# Patient Record
Sex: MALE | Race: WHITE | Employment: FULL TIME | URBAN - METROPOLITAN AREA
[De-identification: names, ages, dates, MRNs, and addresses within clinical notes are randomized per-mention and may not be internally consistent; named-entity substitution may affect disease eponyms.]

---

## 2018-06-29 ENCOUNTER — TELEPHONE (OUTPATIENT)
Dept: FAMILY MEDICINE CLINIC | Age: 58
End: 2018-06-29

## 2018-06-29 DIAGNOSIS — Z12.11 ENCOUNTER FOR SCREENING COLONOSCOPY: Primary | ICD-10-CM

## 2019-01-22 ENCOUNTER — OFFICE VISIT (OUTPATIENT)
Dept: FAMILY MEDICINE CLINIC | Age: 59
End: 2019-01-22
Payer: COMMERCIAL

## 2019-01-22 VITALS
WEIGHT: 175.8 LBS | HEART RATE: 94 BPM | DIASTOLIC BLOOD PRESSURE: 72 MMHG | SYSTOLIC BLOOD PRESSURE: 120 MMHG | HEIGHT: 72 IN | BODY MASS INDEX: 23.81 KG/M2 | OXYGEN SATURATION: 97 %

## 2019-01-22 DIAGNOSIS — I74.9 TIA DUE TO EMBOLISM (HCC): Primary | ICD-10-CM

## 2019-01-22 DIAGNOSIS — G45.9 TIA DUE TO EMBOLISM (HCC): Primary | ICD-10-CM

## 2019-01-22 PROCEDURE — 99213 OFFICE O/P EST LOW 20 MIN: CPT | Performed by: PHYSICIAN ASSISTANT

## 2019-01-22 RX ORDER — ATORVASTATIN CALCIUM 80 MG/1
80 TABLET, FILM COATED ORAL
COMMUNITY
Start: 2019-01-04 | End: 2020-06-25 | Stop reason: SDUPTHER

## 2019-01-22 RX ORDER — ASPIRIN 81 MG/1
TABLET ORAL
Refills: 3 | COMMUNITY
Start: 2019-01-04

## 2019-01-22 RX ORDER — CLOPIDOGREL BISULFATE 75 MG/1
75 TABLET ORAL
COMMUNITY
Start: 2019-01-05 | End: 2020-06-25 | Stop reason: ALTCHOICE

## 2019-01-22 ASSESSMENT — PATIENT HEALTH QUESTIONNAIRE - PHQ9
SUM OF ALL RESPONSES TO PHQ9 QUESTIONS 1 & 2: 0
2. FEELING DOWN, DEPRESSED OR HOPELESS: 0
SUM OF ALL RESPONSES TO PHQ QUESTIONS 1-9: 0
SUM OF ALL RESPONSES TO PHQ QUESTIONS 1-9: 0
1. LITTLE INTEREST OR PLEASURE IN DOING THINGS: 0

## 2019-01-22 ASSESSMENT — ENCOUNTER SYMPTOMS
GASTROINTESTINAL NEGATIVE: 1
RESPIRATORY NEGATIVE: 1

## 2019-08-06 ENCOUNTER — HOSPITAL ENCOUNTER (OUTPATIENT)
Dept: GENERAL RADIOLOGY | Age: 59
Discharge: HOME | End: 2019-08-06
Payer: COMMERCIAL

## 2019-08-06 ENCOUNTER — OFFICE VISIT (OUTPATIENT)
Dept: FAMILY MEDICINE CLINIC | Age: 59
End: 2019-08-06
Payer: COMMERCIAL

## 2019-08-06 VITALS
WEIGHT: 174 LBS | SYSTOLIC BLOOD PRESSURE: 116 MMHG | RESPIRATION RATE: 16 BRPM | DIASTOLIC BLOOD PRESSURE: 64 MMHG | BODY MASS INDEX: 23.57 KG/M2 | HEART RATE: 75 BPM | OXYGEN SATURATION: 98 % | HEIGHT: 72 IN

## 2019-08-06 DIAGNOSIS — G89.29 CHRONIC PAIN OF LEFT THUMB: Primary | ICD-10-CM

## 2019-08-06 DIAGNOSIS — M79.645 CHRONIC PAIN OF LEFT THUMB: Primary | ICD-10-CM

## 2019-08-06 DIAGNOSIS — M79.645 CHRONIC PAIN OF LEFT THUMB: ICD-10-CM

## 2019-08-06 DIAGNOSIS — G89.29 CHRONIC PAIN OF LEFT THUMB: ICD-10-CM

## 2019-08-06 PROBLEM — Q21.12 PFO (PATENT FORAMEN OVALE): Status: ACTIVE | Noted: 2019-05-23

## 2019-08-06 PROCEDURE — 99213 OFFICE O/P EST LOW 20 MIN: CPT | Performed by: INTERNAL MEDICINE

## 2019-08-06 PROCEDURE — 73120 X-RAY EXAM OF HAND: CPT

## 2019-08-06 RX ORDER — PREDNISONE 10 MG/1
TABLET ORAL
Qty: 18 TABLET | Refills: 0 | Status: SHIPPED | OUTPATIENT
Start: 2019-08-06 | End: 2020-06-25 | Stop reason: ALTCHOICE

## 2019-08-06 RX ORDER — NAPROXEN 500 MG/1
500 TABLET ORAL 2 TIMES DAILY WITH MEALS
Qty: 60 TABLET | Refills: 3 | Status: SHIPPED | OUTPATIENT
Start: 2019-08-06 | End: 2020-06-25 | Stop reason: ALTCHOICE

## 2019-08-06 ASSESSMENT — ENCOUNTER SYMPTOMS
RESPIRATORY NEGATIVE: 1
ALLERGIC/IMMUNOLOGIC NEGATIVE: 1
GASTROINTESTINAL NEGATIVE: 1

## 2019-08-06 NOTE — PROGRESS NOTES
Subjective:      Patient ID: Mandy Burt is a 59 y.o. male. HPI  Chronic pain of left thumb  2+ months of L MC-P joint L Thumb. Now getting worse. Pain when moves and recently increased swelling. No known trauma. Past Medical History:   Diagnosis Date    Hepatitis A      Current Outpatient Medications   Medication Sig Dispense Refill    naproxen (EC NAPROSYN) 500 MG EC tablet Take 1 tablet by mouth 2 times daily (with meals) 60 tablet 3    predniSONE (DELTASONE) 10 MG tablet 3 for 3 days, 2 for 3 days, 1 for 3 days. 18 tablet 0    aspirin 81 MG EC tablet TAKE 1 TABLET (81 MG TOTAL) BY MOUTH DAILY WITH BREAKFAST.  3    atorvastatin (LIPITOR) 80 MG tablet Take 80 mg by mouth      clopidogrel (PLAVIX) 75 MG tablet Take 75 mg by mouth       No current facility-administered medications for this visit. Allergies   Allergen Reactions    Hydrocodone-Acetaminophen Other (See Comments)     Fatigue and confusion.  Vicodin [Hydrocodone-Acetaminophen] Other (See Comments)     Fatigue and confusion. Social History     Tobacco Use    Smoking status: Never Smoker    Smokeless tobacco: Never Used   Substance Use Topics    Alcohol use: No     Family History   Problem Relation Age of Onset    Stroke Father        Review of Systems   Constitutional: Negative. Respiratory: Negative. Cardiovascular: Negative. Gastrointestinal: Negative. Endocrine: Negative. Musculoskeletal: Positive for joint swelling (L Thumb MC-P joint. ). Allergic/Immunologic: Negative. Neurological: Negative. Hematological: Negative. Psychiatric/Behavioral: Negative. Objective:   Physical Exam   Constitutional: He is oriented to person, place, and time. Vital signs are normal. He appears well-developed and well-nourished. No distress. HENT:   Head: Normocephalic and atraumatic.    Neck: Trachea normal, normal range of motion, full passive range of motion without pain and phonation normal. Neck Cognition and memory are normal.       Assessment:      Problem   Chronic Pain of Left Thumb. Chronic and worsening. Plan:      All above problems reviewed and the found to be unchanged except for the following :     Chronic Pain of Left Thumb  - Naprosyn 500 mg twice daily w/ food. - Prednisone 3 pills for 3 days, 2 pills for 3 days, 1 pill for 3 days. Will do X-ray. Can use thumb brace and ice.               Kyrie Oliveira MD

## 2019-08-20 ENCOUNTER — OFFICE VISIT (OUTPATIENT)
Dept: FAMILY MEDICINE CLINIC | Age: 59
End: 2019-08-20
Payer: COMMERCIAL

## 2019-08-20 VITALS
HEIGHT: 72 IN | BODY MASS INDEX: 23.57 KG/M2 | OXYGEN SATURATION: 98 % | RESPIRATION RATE: 16 BRPM | SYSTOLIC BLOOD PRESSURE: 106 MMHG | WEIGHT: 174 LBS | DIASTOLIC BLOOD PRESSURE: 58 MMHG | HEART RATE: 60 BPM

## 2019-08-20 DIAGNOSIS — M79.645 CHRONIC PAIN OF LEFT THUMB: Primary | ICD-10-CM

## 2019-08-20 DIAGNOSIS — G89.29 CHRONIC PAIN OF LEFT THUMB: Primary | ICD-10-CM

## 2019-08-20 PROCEDURE — 99213 OFFICE O/P EST LOW 20 MIN: CPT | Performed by: INTERNAL MEDICINE

## 2019-08-20 ASSESSMENT — ENCOUNTER SYMPTOMS
ALLERGIC/IMMUNOLOGIC NEGATIVE: 1
RESPIRATORY NEGATIVE: 1
GASTROINTESTINAL NEGATIVE: 1

## 2019-08-20 NOTE — PATIENT INSTRUCTIONS
All above problems reviewed and the found to be unchanged except for the following :     Chronic Pain of Left Thumb  - Continue Naprosyn, ice, and brace for 1 more week. If not resolved. Gave Referral to Hand MD. Call if new c/o.

## 2019-08-20 NOTE — ASSESSMENT & PLAN NOTE
2+ months of L MT-P joint L Thumb. Now getting worse. Pain when moves and recently increased swelling. No known trauma. Started on Medrol and NAprosyn w/ brace and pain from 10/10 to 5/10.

## 2019-08-20 NOTE — PROGRESS NOTES
Subjective:      Patient ID: Lori Kaur is a 59 y.o. male. HPI  Chronic pain of left thumb  2+ months of L MT-P joint L Thumb. Now getting worse. Pain when moves and recently increased swelling. No known trauma. Started on Medrol and NAprosyn w/ brace and pain from 10/10 to 5/10. X-ray mild OA not consistent w/ amount of swelling when presented. Past Medical History:   Diagnosis Date    Hepatitis A      Current Outpatient Medications   Medication Sig Dispense Refill    naproxen (EC NAPROSYN) 500 MG EC tablet Take 1 tablet by mouth 2 times daily (with meals) 60 tablet 3    predniSONE (DELTASONE) 10 MG tablet 3 for 3 days, 2 for 3 days, 1 for 3 days. 18 tablet 0    aspirin 81 MG EC tablet TAKE 1 TABLET (81 MG TOTAL) BY MOUTH DAILY WITH BREAKFAST.  3    atorvastatin (LIPITOR) 80 MG tablet Take 80 mg by mouth      clopidogrel (PLAVIX) 75 MG tablet Take 75 mg by mouth       No current facility-administered medications for this visit. Allergies   Allergen Reactions    Hydrocodone-Acetaminophen Other (See Comments)     Fatigue and confusion.  Vicodin [Hydrocodone-Acetaminophen] Other (See Comments)     Fatigue and confusion. Social History     Tobacco Use    Smoking status: Never Smoker    Smokeless tobacco: Never Used   Substance Use Topics    Alcohol use: No     Family History   Problem Relation Age of Onset    Stroke Father        Review of Systems   Constitutional: Negative. Respiratory: Negative. Cardiovascular: Negative. Gastrointestinal: Negative. Endocrine: Negative. Genitourinary: Negative. Musculoskeletal: Positive for arthralgias, joint swelling and myalgias. Allergic/Immunologic: Negative. Neurological: Negative. Psychiatric/Behavioral: Negative. Objective:   Physical Exam   Constitutional: He is oriented to person, place, and time. He appears well-developed and well-nourished. No distress. HENT:   Head: Normocephalic and atraumatic.    Eyes:

## 2019-09-19 ENCOUNTER — OFFICE VISIT (OUTPATIENT)
Dept: ORTHOPEDIC SURGERY | Age: 59
End: 2019-09-19
Payer: COMMERCIAL

## 2019-09-19 VITALS — HEIGHT: 72 IN | WEIGHT: 173.94 LBS | BODY MASS INDEX: 23.56 KG/M2 | RESPIRATION RATE: 15 BRPM

## 2019-09-19 DIAGNOSIS — M65.312 TRIGGER FINGER OF LEFT THUMB: ICD-10-CM

## 2019-09-19 DIAGNOSIS — M79.645 CHRONIC PAIN OF LEFT THUMB: Primary | ICD-10-CM

## 2019-09-19 DIAGNOSIS — G89.29 CHRONIC PAIN OF LEFT THUMB: Primary | ICD-10-CM

## 2019-09-19 PROCEDURE — 99243 OFF/OP CNSLTJ NEW/EST LOW 30: CPT | Performed by: ORTHOPAEDIC SURGERY

## 2019-09-19 PROCEDURE — 20550 NJX 1 TENDON SHEATH/LIGAMENT: CPT | Performed by: ORTHOPAEDIC SURGERY

## 2019-11-06 ENCOUNTER — IMMUNIZATION (OUTPATIENT)
Dept: FAMILY MEDICINE CLINIC | Age: 59
End: 2019-11-06
Payer: COMMERCIAL

## 2019-11-06 DIAGNOSIS — Z23 FLU VACCINE NEED: Primary | ICD-10-CM

## 2019-11-06 PROCEDURE — 90471 IMMUNIZATION ADMIN: CPT | Performed by: INTERNAL MEDICINE

## 2019-11-06 PROCEDURE — 90686 IIV4 VACC NO PRSV 0.5 ML IM: CPT | Performed by: INTERNAL MEDICINE

## 2019-12-05 ENCOUNTER — OFFICE VISIT (OUTPATIENT)
Dept: ORTHOPEDIC SURGERY | Age: 59
End: 2019-12-05
Payer: COMMERCIAL

## 2019-12-05 VITALS — WEIGHT: 173.94 LBS | BODY MASS INDEX: 23.56 KG/M2 | HEIGHT: 72 IN

## 2019-12-05 DIAGNOSIS — M65.311 TRIGGER FINGER OF RIGHT THUMB: ICD-10-CM

## 2019-12-05 DIAGNOSIS — M65.312 TRIGGER FINGER OF LEFT THUMB: Primary | ICD-10-CM

## 2019-12-05 PROCEDURE — 99213 OFFICE O/P EST LOW 20 MIN: CPT | Performed by: ORTHOPAEDIC SURGERY

## 2019-12-05 PROCEDURE — 20550 NJX 1 TENDON SHEATH/LIGAMENT: CPT | Performed by: ORTHOPAEDIC SURGERY

## 2019-12-05 RX ORDER — BETAMETHASONE SODIUM PHOSPHATE AND BETAMETHASONE ACETATE 3; 3 MG/ML; MG/ML
12 INJECTION, SUSPENSION INTRA-ARTICULAR; INTRALESIONAL; INTRAMUSCULAR; SOFT TISSUE ONCE
Status: COMPLETED | OUTPATIENT
Start: 2019-12-05 | End: 2019-12-05

## 2019-12-05 RX ADMIN — BETAMETHASONE SODIUM PHOSPHATE AND BETAMETHASONE ACETATE 12 MG: 3; 3 INJECTION, SUSPENSION INTRA-ARTICULAR; INTRALESIONAL; INTRAMUSCULAR; SOFT TISSUE at 07:54

## 2020-06-25 ENCOUNTER — OFFICE VISIT (OUTPATIENT)
Dept: FAMILY MEDICINE CLINIC | Age: 60
End: 2020-06-25
Payer: COMMERCIAL

## 2020-06-25 VITALS
HEART RATE: 88 BPM | HEIGHT: 72 IN | DIASTOLIC BLOOD PRESSURE: 72 MMHG | WEIGHT: 173.6 LBS | TEMPERATURE: 98.3 F | RESPIRATION RATE: 16 BRPM | BODY MASS INDEX: 23.51 KG/M2 | SYSTOLIC BLOOD PRESSURE: 112 MMHG | OXYGEN SATURATION: 98 %

## 2020-06-25 PROBLEM — Z87.74 S/P PATENT FORAMEN OVALE CLOSURE: Status: ACTIVE | Noted: 2020-06-25

## 2020-06-25 PROBLEM — I63.411 CEREBROVASCULAR ACCIDENT (CVA) DUE TO EMBOLISM OF RIGHT MIDDLE CEREBRAL ARTERY (HCC): Status: ACTIVE | Noted: 2020-06-25

## 2020-06-25 PROBLEM — Z00.00 ANNUAL PHYSICAL EXAM: Status: ACTIVE | Noted: 2020-06-25

## 2020-06-25 PROCEDURE — 99396 PREV VISIT EST AGE 40-64: CPT | Performed by: INTERNAL MEDICINE

## 2020-06-25 PROCEDURE — 36415 COLL VENOUS BLD VENIPUNCTURE: CPT | Performed by: INTERNAL MEDICINE

## 2020-06-25 RX ORDER — ATORVASTATIN CALCIUM 80 MG/1
80 TABLET, FILM COATED ORAL DAILY
Qty: 90 TABLET | Refills: 1 | Status: SHIPPED | OUTPATIENT
Start: 2020-06-25 | End: 2021-02-22

## 2020-06-25 ASSESSMENT — PATIENT HEALTH QUESTIONNAIRE - PHQ9
SUM OF ALL RESPONSES TO PHQ QUESTIONS 1-9: 0
SUM OF ALL RESPONSES TO PHQ QUESTIONS 1-9: 0
2. FEELING DOWN, DEPRESSED OR HOPELESS: 0
1. LITTLE INTEREST OR PLEASURE IN DOING THINGS: 0
SUM OF ALL RESPONSES TO PHQ9 QUESTIONS 1 & 2: 0

## 2020-06-25 ASSESSMENT — ENCOUNTER SYMPTOMS
GASTROINTESTINAL NEGATIVE: 1
RESPIRATORY NEGATIVE: 1
ALLERGIC/IMMUNOLOGIC NEGATIVE: 1

## 2020-06-25 NOTE — PROGRESS NOTES
Subjective:      Patient ID: Jodi Pimentel is a 62 y.o. male. HPI  Annual physical exam  Prostate: today  Colonoscopy: 1/2018. rechx 3-5 yrs. Brother w/ Colon Ca, pt had Adenomatous polyp. Eye: due. Hearing: poor hearing L ear>R. Immunizations: Flu shot in Oct/Nov. Rx for Shingrix. Living will: yes  Medical Power of : yes  Cardiac Risk: labs pending. S/P patent foramen ovale closure  Closed in 6/2019 after stroke in 1/2019. Cerebrovascular accident (CVA) due to embolism of right middle cerebral artery ([facility] [address]  Had patent Foramen ovale that caused stroke 1/2019. Complete resolution of symptoms. On Lipitor and Asa. No new c/o. Sees cardiology 12/19/2020. Acute pancreatitis  Had episode in 5/2015. No reoccurrence. No reason found. Did ERCP. 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 Nodular prostate without urinary obstruction  Up once/night. Seems like may be getting a little worse. Past Medical History:   Diagnosis Date    Hepatitis A      Current Outpatient Medications   Medication Sig Dispense Refill    aspirin 81 MG EC tablet TAKE 1 TABLET (81 MG TOTAL) BY MOUTH DAILY WITH BREAKFAST.  3    atorvastatin (LIPITOR) 80 MG tablet Take 80 mg by mouth       No current facility-administered medications for this visit. Allergies   Allergen Reactions    Hydrocodone-Acetaminophen Other (See Comments)     Fatigue and confusion.  Vicodin [Hydrocodone-Acetaminophen] Other (See Comments)     Fatigue and confusion. Social History     Tobacco Use    Smoking status: Never Smoker    Smokeless tobacco: Never Used   Substance Use Topics    Alcohol use: No     Family History   Problem Relation Age of Onset    Stroke Father        Review of Systems   Constitutional: Positive for fatigue. HENT: Positive for hearing loss. Respiratory: Negative. Cardiovascular: Negative. Gastrointestinal: Negative. Endocrine: Negative. Genitourinary: Negative. Musculoskeletal: Negative. Nodular Prostate Without Urinary Obstruction. Chronic mild c/o. Plan:      All above problems reviewed and the found to be unchanged except for the following :     Annual Physical Exam. Flu shot in Oct/Nov. Due for eye exam, will refer for hearing test.      S/P Patent Foramen Ovale Closure. F/u w/ Cardiology 12/2020. Call if new c/o. Cerebrovascular Accident (Cva) Due to Embolism of Right Middle Cerebral Artery (Hcc). No residual. PFO is fixed. On ASA. Acute pancreatitis. Has abnormal gland. No new c/o. Call if new c/o. Nodular Prostate Without Urinary Obstruction. Will do labs. Will call if needs further w/u. To call if new symptoms. Can use Prostate care product if wishes.               Dee Benz MD

## 2020-06-25 NOTE — PATIENT INSTRUCTIONS
All above problems reviewed and the found to be unchanged except for the following :     Annual Physical Exam. Flu shot in Oct/Nov. Due for eye exam, will refer for hearing test.      S/P Patent Foramen Ovale Closure. F/u w/ Cardiology 12/2020. Call if new c/o. Cerebrovascular Accident (Cva) Due to Embolism of Right Middle Cerebral Artery (Hcc). No residual. PFO is fixed. On ASA. Acute pancreatitis. Has abnormal gland. No new c/o. Call if new c/o. Nodular Prostate Without Urinary Obstruction. Will do labs. Will call if needs further w/u. To call if new symptoms. Can use Prostate care product if wishes. Prostate: today  Colonoscopy: 1/2018. rechx 3-5 yrs. Brother w/ Colon Ca, pt had Adenomatous polyp. Eye: due. Hearing: poor hearing L ear>R. Immunizations: Flu shot in Oct/Nov. Rx for Shingrix. Living will: yes  Medical Power of : yes  Cardiac Risk: labs pending.

## 2020-06-26 LAB
ALBUMIN SERPL-MCNC: 4.6 G/DL (ref 3.4–5)
ALP BLD-CCNC: 80 U/L (ref 40–129)
ALT SERPL-CCNC: 24 U/L (ref 10–40)
ANION GAP SERPL CALCULATED.3IONS-SCNC: 11 MMOL/L (ref 3–16)
AST SERPL-CCNC: 23 U/L (ref 15–37)
BILIRUB SERPL-MCNC: 0.9 MG/DL (ref 0–1)
BILIRUBIN DIRECT: <0.2 MG/DL (ref 0–0.3)
BILIRUBIN, INDIRECT: NORMAL MG/DL (ref 0–1)
BUN BLDV-MCNC: 16 MG/DL (ref 7–20)
CALCIUM SERPL-MCNC: 9.6 MG/DL (ref 8.3–10.6)
CHLORIDE BLD-SCNC: 103 MMOL/L (ref 99–110)
CHOLESTEROL, TOTAL: 119 MG/DL (ref 0–199)
CO2: 26 MMOL/L (ref 21–32)
CREAT SERPL-MCNC: 0.7 MG/DL (ref 0.8–1.3)
GFR AFRICAN AMERICAN: >60
GFR NON-AFRICAN AMERICAN: >60
GLUCOSE BLD-MCNC: 92 MG/DL (ref 70–99)
HCT VFR BLD CALC: 46.5 % (ref 40.5–52.5)
HDLC SERPL-MCNC: 42 MG/DL (ref 40–60)
HEMOGLOBIN: 15.8 G/DL (ref 13.5–17.5)
LDL CHOLESTEROL CALCULATED: 62 MG/DL
MCH RBC QN AUTO: 31.2 PG (ref 26–34)
MCHC RBC AUTO-ENTMCNC: 33.9 G/DL (ref 31–36)
MCV RBC AUTO: 92.1 FL (ref 80–100)
PDW BLD-RTO: 13.1 % (ref 12.4–15.4)
PHOSPHORUS: 4.1 MG/DL (ref 2.5–4.9)
PLATELET # BLD: 234 K/UL (ref 135–450)
PMV BLD AUTO: 8.4 FL (ref 5–10.5)
POTASSIUM SERPL-SCNC: 4.5 MMOL/L (ref 3.5–5.1)
PROSTATE SPECIFIC ANTIGEN: 1.28 NG/ML (ref 0–4)
RBC # BLD: 5.05 M/UL (ref 4.2–5.9)
SODIUM BLD-SCNC: 140 MMOL/L (ref 136–145)
TOTAL PROTEIN: 6.7 G/DL (ref 6.4–8.2)
TRIGL SERPL-MCNC: 74 MG/DL (ref 0–150)
TSH SERPL DL<=0.05 MIU/L-ACNC: 2.14 UIU/ML (ref 0.27–4.2)
VLDLC SERPL CALC-MCNC: 15 MG/DL
WBC # BLD: 8.3 K/UL (ref 4–11)

## 2020-07-01 ENCOUNTER — TELEPHONE (OUTPATIENT)
Dept: ORTHOPEDIC SURGERY | Age: 60
End: 2020-07-01

## 2020-07-08 RX ORDER — LIDOCAINE HYDROCHLORIDE 10 MG/ML
1 INJECTION, SOLUTION INFILTRATION; PERINEURAL ONCE
Status: CANCELLED | OUTPATIENT
Start: 2020-07-08 | End: 2020-07-08

## 2020-07-08 RX ORDER — BETAMETHASONE SODIUM PHOSPHATE AND BETAMETHASONE ACETATE 3; 3 MG/ML; MG/ML
6 INJECTION, SUSPENSION INTRA-ARTICULAR; INTRALESIONAL; INTRAMUSCULAR; SOFT TISSUE ONCE
Status: CANCELLED | OUTPATIENT
Start: 2020-07-08 | End: 2020-07-08

## 2020-07-09 ENCOUNTER — OFFICE VISIT (OUTPATIENT)
Dept: ORTHOPEDIC SURGERY | Age: 60
End: 2020-07-09
Payer: COMMERCIAL

## 2020-07-09 VITALS — HEIGHT: 72 IN | WEIGHT: 173 LBS | BODY MASS INDEX: 23.43 KG/M2

## 2020-07-09 PROCEDURE — 99214 OFFICE O/P EST MOD 30 MIN: CPT | Performed by: ORTHOPAEDIC SURGERY

## 2020-07-09 NOTE — PROGRESS NOTES
Chief Complaint:  Finger Pain (CK RIGHT TF THUMB- REQ INJECTION)       History of Present of Illness: The patient returns and he has unfortunately developed return of triggering to the right thumb. He feels that his left side is currently doing well although has some occasional stiffness without locking. Review of Systems  Pertinent items are noted in HPI  Denies fever, chills, confusion, bowel/bladder active change. Review of systems reviewed from Patient History Form dated on 9/19/2019 and available in the patient's chart under the Media tab. Examination:  On exam today he has tenderness over the A1 pulley of the right thumb with grade 3 locking. I do not appreciate any discrete locking on the opposite left thumb although he does relate some soreness near the base of the thumb and radiating to the MP region of the left index. All fingers are perfused and sensate and provocative testing for carpal tunnel syndrome is negative     Radiology:     X-rays obtained and reviewed in office:  Views    Location    Impression      No orders of the defined types were placed in this encounter. Impression:  Encounter Diagnosis   Name Primary?  Trigger finger of right thumb Yes         Treatment Plan:  I discussed with the patient the options moving forward. It appears that he would do much better with definitive outpatient trigger thumb release surgery. We talked over the options of that versus another injection. After thoughtful long conversation he decided to simply move forward with definitive outpatient right thumb trigger release. He feels comfortable with a straight local anesthetic approach. We discussed the risks, benefits, limitations, alternatives, and postop recovery following the proposed procedure. We will begin the process of scheduling surgery if there are no other preoperative medical contraindications.     Please note that this transcription was created using voice recognition software. Any errors are unintentional and may be due to voice recognition transcription.

## 2020-07-15 ENCOUNTER — TELEPHONE (OUTPATIENT)
Dept: ORTHOPEDIC SURGERY | Age: 60
End: 2020-07-15

## 2020-07-16 ENCOUNTER — OFFICE VISIT (OUTPATIENT)
Dept: PRIMARY CARE CLINIC | Age: 60
End: 2020-07-16
Payer: COMMERCIAL

## 2020-07-16 PROCEDURE — 99211 OFF/OP EST MAY X REQ PHY/QHP: CPT | Performed by: NURSE PRACTITIONER

## 2020-07-16 NOTE — PROGRESS NOTES
Elif Cosme received a viral test for COVID-19. They were educated on isolation and quarantine as appropriate. For any symptoms, they were directed to seek care from their PCP, given contact information to establish with a doctor, directed to an urgent care or the emergency room.

## 2020-07-19 LAB
SARS-COV-2: NOT DETECTED
SOURCE: NORMAL

## 2020-07-21 NOTE — H&P
HISTORY & PHYSICAL FOR LOCAL CASES    There were no vitals filed for this visit. History of present illness: Right thumb trigger digit    All allergies & meds reviewed  RELEVANT EXAMS:  Airway:  normal    Pulmonary: normal    Cardiovascular: normal    Abdominal: normal    Specific to procedure: Right trigger thumb    I have reviewed with the patient and/or family the risks, benefits and alternatives to the procedure.   ASA Class:  2    The patient condition is acceptable for planned local anesthesia:

## 2020-07-21 NOTE — OP NOTE
[address] [facility]  Procedure Note  [address]  7/22/2020    PREOPERATIVE DIAGNOSIS(ES)   Right Thumb trigger digit     POSTOPERATIVE DIAGNOSIS(ES)   Same    PROCEDURE(S)     Right Thumb trigger release    SURGEON DEVONTE CORTEZ    ANESTHESIA Local    EBL: less than 44YF    COMPLICATIONS None    INDICATIONS FOR PROCEDURE The patient has clinical evidence of triggering of the affected digit(s) and has failed appropriate conservative management. The patient understands the relevant risks, benefits, limitations, and healing process of the procedure. PROCEDURE The patient was brought to the operating room and anesthetized with local anesthetic. The identified and marked extremity was then prepped and draped in a standard fashion. A well-padded tourniquet was applied to the upper arm. The arm was exsanguinated and the tourniquet elevated to 250 mmHg. A standard transverse incision was made at the A-1 pulley level of the affected digit(s). Dissection was carried down carefully through the skin and subcutaneous tissue. Care was taken to protect the digital neurovascular bundles on both sides of the identified A-1 pulley(s). In each affected digit the pulley was incised longitudinally under direct visualization. Complete proximal and distal release was completed. The flexor tendon structures were withdrawn with a retractor and demonstrated full passive excursion. The tourniquet was released and the wound(s) irrigated with sterile saline. Skin was closed with Nylon suture. A soft, bulky dressing was applied and the patient transported to the recovery area in stable condition with good perfusion to all fingertips and no active triggering.         [address]

## 2020-07-22 ENCOUNTER — HOSPITAL ENCOUNTER (OUTPATIENT)
Age: 60
Setting detail: OUTPATIENT SURGERY
Discharge: HOME | End: 2020-07-22
Attending: ORTHOPAEDIC SURGERY | Admitting: ORTHOPAEDIC SURGERY
Payer: COMMERCIAL

## 2020-07-22 VITALS
OXYGEN SATURATION: 98 % | WEIGHT: 173 LBS | RESPIRATION RATE: 18 BRPM | DIASTOLIC BLOOD PRESSURE: 73 MMHG | SYSTOLIC BLOOD PRESSURE: 119 MMHG | TEMPERATURE: 96.8 F | HEART RATE: 65 BPM | BODY MASS INDEX: 23.43 KG/M2 | HEIGHT: 72 IN

## 2020-07-22 PROCEDURE — 7100000010 HC PHASE II RECOVERY - FIRST 15 MIN: Performed by: ORTHOPAEDIC SURGERY

## 2020-07-22 PROCEDURE — 7100000011 HC PHASE II RECOVERY - ADDTL 15 MIN: Performed by: ORTHOPAEDIC SURGERY

## 2020-07-22 PROCEDURE — 3600000002 HC SURGERY LEVEL 2 BASE: Performed by: ORTHOPAEDIC SURGERY

## 2020-07-22 PROCEDURE — 2500000003 HC RX 250 WO HCPCS: Performed by: ORTHOPAEDIC SURGERY

## 2020-07-22 PROCEDURE — 2709999900 HC NON-CHARGEABLE SUPPLY: Performed by: ORTHOPAEDIC SURGERY

## 2020-07-22 PROCEDURE — 2580000003 HC RX 258: Performed by: ORTHOPAEDIC SURGERY

## 2020-07-22 PROCEDURE — 3600000012 HC SURGERY LEVEL 2 ADDTL 15MIN: Performed by: ORTHOPAEDIC SURGERY

## 2020-07-22 RX ORDER — MAGNESIUM HYDROXIDE 1200 MG/15ML
LIQUID ORAL CONTINUOUS PRN
Status: COMPLETED | OUTPATIENT
Start: 2020-07-22 | End: 2020-07-22

## 2020-07-22 RX ORDER — OXYCODONE HYDROCHLORIDE 5 MG/1
5 TABLET ORAL EVERY 8 HOURS PRN
Qty: 10 TABLET | Refills: 0 | Status: SHIPPED | OUTPATIENT
Start: 2020-07-22 | End: 2020-07-29

## 2020-07-22 RX ORDER — BUPIVACAINE HYDROCHLORIDE 5 MG/ML
INJECTION, SOLUTION EPIDURAL; INTRACAUDAL PRN
Status: DISCONTINUED | OUTPATIENT
Start: 2020-07-22 | End: 2020-07-22 | Stop reason: ALTCHOICE

## 2020-07-22 RX ORDER — IBUPROFEN 600 MG/1
600 TABLET ORAL EVERY 6 HOURS PRN
Qty: 60 TABLET | Refills: 1 | Status: SHIPPED | OUTPATIENT
Start: 2020-07-22 | End: 2020-10-15

## 2020-07-22 ASSESSMENT — PAIN SCALES - GENERAL
PAINLEVEL_OUTOF10: 0

## 2020-07-22 ASSESSMENT — PAIN - FUNCTIONAL ASSESSMENT: PAIN_FUNCTIONAL_ASSESSMENT: 0-10

## 2020-07-22 NOTE — PROGRESS NOTES
Discharge instructions reviewed with patient/responsible adult and understanding verbalized. Discharge instructions signed and copies given. Patient discharged home with belongings.   Prescription X2 sent with pt and/or family

## 2020-07-25 PROBLEM — Z00.00 ANNUAL PHYSICAL EXAM: Status: RESOLVED | Noted: 2020-06-25 | Resolved: 2020-07-25

## 2020-07-31 ENCOUNTER — OFFICE VISIT (OUTPATIENT)
Dept: ORTHOPEDIC SURGERY | Age: 60
End: 2020-07-31

## 2020-07-31 PROCEDURE — 99024 POSTOP FOLLOW-UP VISIT: CPT | Performed by: ORTHOPAEDIC SURGERY

## 2020-07-31 NOTE — PROGRESS NOTES
The patient is doing overall quite well after surgery. The wound is healing without signs of infection or dehiscence. There is satisfactory range of motion of the fingers without triggering. Doing well after right thumb trigger finger release. We will plan for suture removal, apply a simple bandage, and discuss appropriate wound care. Activities may be advanced depending upon comfort. Follow-up and therapy will be depending upon range of symptoms over the next several weeks. We will keep an eye on the opposite left thumb that previously had a trigger digit as well but is currently asymptomatic.

## 2020-10-05 NOTE — PROGRESS NOTES
Evy Mcnair   1960, 62 y.o. male   [id number]       Referring Provider: Daisha Andre MD   Referral Type: In an order in [address] [place] [address]    Reason for Visit: Evaluation of suspected change in hearing. ADULT AUDIOLOGIC EVALUATION      Evy Mcnair is a 62 y.o. male seen today, 10/9/2020 , for an initial audiologic evaluation. AUDIOLOGIC AND OTHER PERTINENT MEDICAL HISTORY:      Evy Mcnair noted a perceived gradual decline in hearing, bilaterally. He states hearing in left ear is worse compared right. Patient reports family history of age-related hearing loss. No additional significant otologic or medical history was reported. Evy Mcnair denied otalgia, aural fullness, otorrhea, tinnitus, dizziness, imbalance, history of falls, history of occupational/recreational noise exposure, history of head trauma and history of ear surgery. Date: 10/9/2020     IMPRESSIONS:      Today's results revealed an asymmetric sensorineural hearing loss with excellent word recognition, bilaterally. Asymmetries of >10 dBHL noted from 1.5-8kHz with left ear worse than right. Hearing loss significant enough to create hearing difficulty in most listening situations. Recommended consult with ENT physician due to noted asymmetries. Discussed benefits of amplification pending medical clearance due to noted asymmetries. ASSESSMENT AND FINDINGS:     Otoscopy revealed: Clear ear canals bilaterally    RIGHT EAR:  Hearing Sensitivity: Within normal limits through 2kHz sloping to a moderate to moderately-severe sensorineural hearing loss. Speech Recognition Threshold: 15 dB HL  Word Recognition: Excellent (92%), based on NU-6 25-word list at 55 dBHL using recorded speech stimuli. Tympanometry: Normal peak pressure and compliance, Type A tympanogram, consistent with normal middle ear function.   Acoustic Reflexes: Ipsilateral: Could not maintain seal. Contralateral: Could not maintain seal.    LEFT EAR:  Hearing Sensitivity: Within normal limits through 1kHz precipitously sloping to a moderate to severe sensorineural hearing loss. Speech Recognition Threshold: 25 dB HL  Word Recognition: Excellent (100%), based on NU-6 25-word list at 75 dBHL masked using recorded speech stimuli. Tympanometry: Normal peak pressure and compliance, Type A tympanogram, consistent with normal middle ear function. Acoustic Reflexes: Ipsilateral: Did not test. Contralateral: Did not test.    Reliability: Good  Transducer: Inserts (checked with headphones)     **NOTE:  Asymmetries of >10 dBHL noted from 1.5-8kHz with left ear worse than right. Hearing loss significant enough to create hearing difficulty in most listening situations. See scanned audiogram dated 10/9/2020  for results. PATIENT EDUCATION:       The following items were discussed with the patient:    - Good Communication Strategies   - Hearing Loss and Hearing Aids    Educational information was shared in the After Visit Summary. RECOMMENDATIONS:                                                                                                                                                                                                                                                          The following items are recommended based on patient report and results from today's appointment:   - Continue medical follow-up with Fransisca Hubbard MD .   - Consult with ENT physician due to noted asymmetries. - Retest hearing as medically indicated and/or sooner if a change in hearing is noted. - If desired, schedule a Hearing Aid Evaluation (HAE) appointment to discuss hearing aid options pending medical clearance due to noted asymmetries. Recommended patient contact insurance to determine possible hearing aid benefit.    - Utilize \"Good Communication Strategies\" as discussed to assist in speech understanding with communication partners.        Cathy Molina  Audiologist    Chart CC'd to: Kat Warren MD       Degree of   Hearing Sensitivity dB Range   Within Normal Limits (WNL) 0 - 20   Mild 20 - 40   Moderate 40 - 55   Moderately-Severe 55 - 70   Severe 70 - 90   Profound 90 +

## 2020-10-09 ENCOUNTER — PROCEDURE VISIT (OUTPATIENT)
Dept: AUDIOLOGY | Age: 60
End: 2020-10-09
Payer: COMMERCIAL

## 2020-10-09 VITALS — TEMPERATURE: 97.1 F

## 2020-10-09 PROBLEM — H90.3 SENSORINEURAL HEARING LOSS, BILATERAL: Status: ACTIVE | Noted: 2020-10-09

## 2020-10-09 PROCEDURE — 92557 COMPREHENSIVE HEARING TEST: CPT | Performed by: AUDIOLOGIST

## 2020-10-09 PROCEDURE — 92567 TYMPANOMETRY: CPT | Performed by: AUDIOLOGIST

## 2020-10-09 NOTE — PATIENT INSTRUCTIONS
Good Communication Strategies    Communication can be challenging for anyone, but can be especially difficult for those with some degree of hearing loss. While we may not be able to control every factor that may lead to difficulty with communication, there are Good Communication Strategies that we can all use in our day-to-day lives. Communication takes both parties working together for it to be successful. Tips as a Listener:   1. Control your environment. It is important to limit the amount of background noise in the room when possible. You should also consider having a good light source in the room to best see the other person. 2. Ask for clarification. Instead of saying \"What?\", you can use parts of what you heard to make a new question. For example, if you heard the word \"Thursday\" but not the rest of the week, you may ask \"What was that about Thursday? \" or \"What did you want to do Thursday? \". This shows the person talking that you are listening and will help them better explain what they are saying. 3. Be an advocate for yourself. If you are hearing but not understanding, tell the other person \"I can hear you, but I need you to slow down when you speak. \"  Or if someone is facing the other direction, say \"I cannot hear you when you are not looking at me when we talk. \"       Tips as a Talker:   - Sit or stand 3 to 6 feet away to maximize audibility         -- It is unrealistic to believe someone else will fully hear your message if you are speaking from across the room or in a different room in the house   - Stay at eye level to help with visual cues   - Make sure you have the persons attention before speaking   - Use facial expressions and gestures to accentuate your message   - Raise your voice slightly (do not scream)   - Speak slowly and distinctly   - Use short, simple sentences   - Rephrase your words if the person is having a hard time understanding you    - To avoid distortion, dont speak directly into a persons ear      Some additional items that may be helpful:   - Remain patient - this is important for both parties   - Write down items that still cannot be heard/understood. You may write with pen/paper or consider typing/texting on a cell phone or smart device. - If background noise is unavoidable, try to keep yourself in a good position in the room. By sitting at a blanco on the side of the restaurant (preferably a corner), it will be easier to communicate than if you were sitting at a table in the middle with background noise surrounding you. Keep yourself positioned away from music speakers or heavy foot traffic. Hearing Loss: Care Instructions  Your Care Instructions      Hearing loss is a sudden or slow decrease in how well you hear. It can range from mild to profound. Permanent hearing loss can occur with aging, and it can happen when you are exposed long-term to loud noise. Examples include listening to loud music, riding motorcycles, or being around other loud machines. Hearing loss can affect your work and home life. It can make you feel lonely or depressed. You may feel that you have lost your independence. But hearing aids and other devices can help you hear better and feel connected to others. Follow-up care is a key part of your treatment and safety. Be sure to make and go to all appointments, and call your doctor if you are having problems. It's also a good idea to know your test results and keep a list of the medicines you take. How can you care for yourself at home? · Avoid loud noises whenever possible. This helps keep your hearing from getting worse. Always wear hearing protection around loud noises. · If appropriate, wear hearing aid(s) as directed. It is recommended that hearing aids are worn during all waking hours to keep your brain active and give it access to the sounds it is missing.       · If you are beginning your process with hearing aid(s), schedule a \"Hearing Aid Evaluation\" with an audiologist to discuss your lifestyle, features of hearing aid technology, and styles of hearing aids available. It is recommended that you contact your insurance company to determine if you have a hearing aid benefit, as this may dictate who you can see for these services. · Have hearing tests as your doctor suggests. They can show whether your hearing has changed. Your hearing aid may need to be adjusted. · Use other assistive devices as needed. These may include:  ? Telephone amplifiers and hearing aids that can connect to a television, stereo, radio, or microphone. ? Devices that use lights or vibrations. These alert you to the doorbell, a ringing telephone, or a baby monitor. ? Television closed-captioning. This shows the words at the bottom of the screen. Most new TVs can do this. ? TTY (text telephone). This lets you type messages back and forth on the telephone instead of talking or listening. These devices are also called TDD. When messages are typed on the keyboard, they are sent over the phone line to a receiving TTY. The message is shown on a monitor. · Use pagers, fax machines, text, and email if it is hard for you to communicate by telephone. · Try to learn a listening technique called speech-reading. It is not lip-reading. You pay attention to people's gestures, expressions, posture, and tone of voice. These clues can help you understand what a person is saying. Face the person you are talking to, and have him or her face you. Make sure the lighting is good. You need to see the other person's face clearly. · Think about counseling if you need help to adjust to your hearing loss. When should you call for help? Watch closely for changes in your health, and be sure to contact your doctor if:    · You think your hearing is getting worse. · You have new symptoms, such as dizziness or nausea.

## 2020-10-12 ENCOUNTER — TELEPHONE (OUTPATIENT)
Dept: FAMILY MEDICINE CLINIC | Age: 60
End: 2020-10-12

## 2020-10-12 NOTE — TELEPHONE ENCOUNTER
----- Message from Lyric Krueger MD sent at 10/9/2020  1:09 PM EDT -----  Lyric Krueger MD at 10/9/2020  1:09 PM     Status: Signed       appt to see ENT due to abnormal Audiogram            ----- Message -----  From: Cathy Richardson  Sent: 10/9/2020   9:57 AM EDT  To: MD Dr. Payton Johnson you for your referral for audiometric testing on this patient. Today's results revealed an asymmetric sensorineural hearing loss with excellent word recognition, bilaterally. Asymmetries of >10 dBHL noted from 1.5-8kHz with left ear worse than right. Hearing loss significant enough to create hearing difficulty in most listening situations. Recommended consult with ENT physician due to noted asymmetries. Discussed benefits of amplification pending medical clearance due to noted asymmetries. Please see the scanned audiogram (under \"Media\" tab) and encounter note for details. If you have any questions, or if there is anything else you need, please let me know. Cathy ElizaldeAudiologist---[facility] - Audiology

## 2020-10-15 ENCOUNTER — OFFICE VISIT (OUTPATIENT)
Dept: ENT CLINIC | Age: 60
End: 2020-10-15
Payer: COMMERCIAL

## 2020-10-15 VITALS
HEART RATE: 81 BPM | BODY MASS INDEX: 24.35 KG/M2 | HEIGHT: 72 IN | WEIGHT: 179.8 LBS | TEMPERATURE: 97.2 F | SYSTOLIC BLOOD PRESSURE: 109 MMHG | DIASTOLIC BLOOD PRESSURE: 66 MMHG

## 2020-10-15 PROCEDURE — 99203 OFFICE O/P NEW LOW 30 MIN: CPT | Performed by: OTOLARYNGOLOGY

## 2020-10-15 NOTE — TELEPHONE ENCOUNTER
Patient calling as an FYI to let you know that he saw Dr Sascha Higgins today and he recommended an MRI to rule out other issues.

## 2020-10-15 NOTE — PROGRESS NOTES
[address] [facility]  NEW PATIENT HISTORY AND PHYSICAL NOTE      Patient Name: Evy Mcnair  Medical Record Number:  [id number]  Primary Care Physician:  Daisha Andre MD    ChiefComplaint     Chief Complaint   Patient presents with    Hearing Problem     Had a hearing test on 10/09/2020 with Dr. Marquis Vazquez, here to get his ears checked before moving forward with hearing aids       History of Present Illness     Evy Mcnair is an 62 y.o. male with history of progressive hearing loss - has been ongoing for the past 3 years. Believes that the left ear is worse than the right, denies tinnitus, otorrhea, otalgia, aural fullness or vertigo. No history of chronic middle ear disease, did not have any recurrent acute otitis media as a child/no placement of tympanostomy tubes. Unremarkable birth history, no familial history of early deafness. Prior CVA 01/2019 - MRI showed right MCA infarcts. He does not smoke, denies alcohol use. Prior patent foramen ovale which has since been repaired.     Past Medical History     Past Medical History:   Diagnosis Date    Cerebral artery occlusion with cerebral infarction ([address]     TIA    Hearing loss     Hepatitis A     Hyperlipidemia        Past Surgical History     Past Surgical History:   Procedure Laterality Date    BACK SURGERY      CARDIAC SURGERY      cath - repair PFO    CHOLECYSTECTOMY      COLONOSCOPY  2007    FINGER TRIGGER RELEASE Right 7/22/2020    RIGHT TRIGGER THUMB RELEASE performed by Scarlett Cheng MD at [address] [place] [address]         Family History     Family History   Problem Relation Age of Onset    Stroke Father     Breast Cancer Sister     Colon Cancer Brother        Social History     Social History     Tobacco Use    Smoking status: Never Smoker    Smokeless tobacco: Never Used   Substance Use Topics    Alcohol use: No    Drug use: No        Allergies     Allergies   Allergen Reactions    Hydrocodone-Acetaminophen Other (See Comments)     Fatigue and confusion.  Vicodin [Hydrocodone-Acetaminophen] Other (See Comments)     Fatigue and confusion. Medications     Current Outpatient Medications   Medication Sig Dispense Refill    atorvastatin (LIPITOR) 80 MG tablet Take 1 tablet by mouth daily 90 tablet 1    aspirin 81 MG EC tablet TAKE 1 TABLET (81 MG TOTAL) BY MOUTH DAILY WITH BREAKFAST.  3     No current facility-administered medications for this visit.         Review of Systems     REVIEW OF SYSTEMS  The following systems were reviewed and revealed the following in addition to any already discussed in the HPI:    CONSTITUTIONAL: No weight loss, no fever, no night sweats, no chills  EYES: no vision changes, no blurry vision  EARS: + Changes in hearing, no otalgia  NOSE: no epistaxis, no rhinorrhea  RESPIRATORY: No difficulty breathing, no shortness of breath  CV: no chest pain, no peripheral vascular disease  HEME: No coagulation disorder, no bleeding disorder  NEURO: No TIA or stroke-like symptoms  SKIN: No new rashes in the head and neck, no recent skin cancers  MOUTH: No new ulcers, no recent teeth infections  GASTROINTESTINAL: No diarrhea, stomach pain  PSYCH: No anxiety, no depression    PhysicalExam     Vitals:    10/15/20 0851   BP: 109/66   Site: Left Upper Arm   Position: Sitting   Cuff Size: Medium Adult   Pulse: 81   Temp: 97.2 °F (36.2 °C)   TempSrc: Infrared   Weight: 179 lb 12.8 oz (81.6 kg)   Height: 6' (1.829 m)       PHYSICAL EXAM  /66 (Site: Left Upper Arm, Position: Sitting, Cuff Size: Medium Adult)   Pulse 81   Temp 97.2 °F (36.2 °C) (Infrared)   Ht 6' (1.829 m)   Wt 179 lb 12.8 oz (81.6 kg)   BMI 24.39 kg/m²     GENERAL: No Acute Distress, Alert and Oriented, no hoarseness  EYES: EOMI, Anti-icteric  NOSE: On anterior rhinoscopy there is no epistaxis, nasal mucosa within normal limits, no purulent drainage  EARS: Normal external appearance; on portable otomicroscopy:  -Right ear: External auditory canal without stenosis, tympanic membrane clear, no middle ear effusions or retractions  -Left ear: External auditory canal without stenosis, tympanic membrane clear, no middle ear effusions or retractions  -Tuning fork testing: With a 512-Hz tuning fork the Jarvis is midline, The Rinne on the right ear the is air>bone conduction, on the left ear air>bone conduction  FACE: 1/6 House-Brackmann Scale, symmetric, sensation equal bilaterally  ORAL CAVITY: No masses or lesions palpated, uvula is absent, moist mucous membranes, 0+ tonsils, dentition with multiple fillings,  NECK: Normal range of motion, no thyromegaly, trachea is midline, no lymphadenopathy, no neck masses, no crepitus  CHEST: Normal respiratory effort, no retractions, breathing comfortably  SKIN: No rashes, normal appearing skin, no evidence of skin lesions/tumors  NEURO: CN 2, 3, 4, 5, 6, 7, 11, 12 intact bilaterally     Data/Imaging Review     RIGHT EAR:  Hearing Sensitivity: Within normal limits through 2kHz sloping to a moderate to moderately-severe sensorineural hearing loss. Speech Recognition Threshold: 15 dB HL  Word Recognition: Excellent (92%), based on NU-6 25-word list at 55 dBHL using recorded speech stimuli. Tympanometry: Normal peak pressure and compliance, Type A tympanogram, consistent with normal middle ear function. Acoustic Reflexes: Ipsilateral: Could not maintain seal. Contralateral: Could not maintain seal.     LEFT EAR:  Hearing Sensitivity: Within normal limits through 1kHz precipitously sloping to a moderate to severe sensorineural hearing loss. Speech Recognition Threshold: 25 dB HL  Word Recognition: Excellent (100%), based on NU-6 25-word list at 75 dBHL masked using recorded speech stimuli. Tympanometry: Normal peak pressure and compliance, Type A tympanogram, consistent with normal middle ear function.   Acoustic Reflexes: Ipsilateral: Did not test. Contralateral: Did not test.     Reliability: Good  Transducer: Inserts (checked with headphones)      **NOTE:  Asymmetries of >10 dBHL noted from 1.5-8kHz with left ear worse than right. Hearing loss significant enough to create hearing difficulty in most listening situations.     See scanned audiogram dated 10/9/2020  for results.            Procedure     None    Assessment and Plan     1. Asymmetrical hearing loss of both ears  67-year-old male with history of progressive hearing loss, left ear worse than the right. Prior audiometric testing shows asymmetric hearing loss in the left ear, with greater than 15 dB deficits from 2 kHz - 8 kHz. He denies any tinnitus, otorrhea, otalgia, aural fullness or vertigo. On examination the ear canals are clear, the tympanic membranes are without retractions/perforations, and the middle ears are well aerated. Due to the lack of pathology on physical examination, combined with asymmetric sensorineural hearing loss, we have recommended an MRI to evaluate for retrocochlear pathology. He is agreed to undergo this study, and we will call him back with results. If no pathology is identified, we will clear him for hearing aids. - MRI IAC POSTERIOR FOSSA W WO CONTRAST; Future  - BASIC METABOLIC PANEL; Future    Return if symptoms worsen or fail to improve. Kathy Ash MD  [facility]  Department of Otolaryngology/Head and Neck Surgery  10/15/20    I have performed a head and neck physical exam personally or was physically present during the key or critical portions of the service. Medical Decision Making:   The following items were considered in medical decision making:  Independent review of images  Review / order clinical lab tests  Review / order radiology tests  Decision to obtain old records  Review and summation of old records as accessed through [facility] (a summary of my findings in these old records: Reviewed records from cardiology at [facility])     Portions of this note were dictated using Dragon.  There may be linguistic errors secondary to the use of this program.

## 2020-10-16 ENCOUNTER — TELEPHONE (OUTPATIENT)
Dept: ENT CLINIC | Age: 60
End: 2020-10-16

## 2020-10-16 NOTE — TELEPHONE ENCOUNTER
AMPALTZERTOF Device in heart that could stop him from getting the MRI that was ordered. Patient would like to talk to Dr. Antonio Hines about this. Cell phone number [phone].

## 2020-10-16 NOTE — TELEPHONE ENCOUNTER
Called patient back - per literature at [facility] device is safe for MRI use up to 3 Ana.   We encouraged him to discuss with the radiology staff prior to his MRI, and this patient affirmed understanding

## 2020-10-30 ENCOUNTER — HOSPITAL ENCOUNTER (OUTPATIENT)
Dept: MRI IMAGING | Age: 60
Discharge: HOME | End: 2020-10-30
Payer: COMMERCIAL

## 2020-10-30 ENCOUNTER — HOSPITAL ENCOUNTER (OUTPATIENT)
Age: 60
Discharge: HOME | End: 2020-10-30
Payer: COMMERCIAL

## 2020-10-30 LAB
ANION GAP SERPL CALCULATED.3IONS-SCNC: 9 MMOL/L (ref 3–16)
BUN BLDV-MCNC: 16 MG/DL (ref 7–20)
CALCIUM SERPL-MCNC: 9.5 MG/DL (ref 8.3–10.6)
CHLORIDE BLD-SCNC: 106 MMOL/L (ref 99–110)
CO2: 28 MMOL/L (ref 21–32)
CREAT SERPL-MCNC: 0.8 MG/DL (ref 0.8–1.3)
GFR AFRICAN AMERICAN: >60
GFR NON-AFRICAN AMERICAN: >60
GLUCOSE BLD-MCNC: 111 MG/DL (ref 70–99)
POTASSIUM SERPL-SCNC: 4.2 MMOL/L (ref 3.5–5.1)
SODIUM BLD-SCNC: 143 MMOL/L (ref 136–145)

## 2020-10-30 PROCEDURE — 80048 BASIC METABOLIC PNL TOTAL CA: CPT

## 2020-10-30 PROCEDURE — 6360000004 HC RX CONTRAST MEDICATION: Performed by: OTOLARYNGOLOGY

## 2020-10-30 PROCEDURE — A9579 GAD-BASE MR CONTRAST NOS,1ML: HCPCS | Performed by: OTOLARYNGOLOGY

## 2020-10-30 PROCEDURE — 70553 MRI BRAIN STEM W/O & W/DYE: CPT

## 2020-10-30 PROCEDURE — 36415 COLL VENOUS BLD VENIPUNCTURE: CPT

## 2020-10-30 RX ADMIN — GADOTERIDOL 15 ML: 279.3 INJECTION, SOLUTION INTRAVENOUS at 08:58

## 2020-11-09 ENCOUNTER — TELEPHONE (OUTPATIENT)
Dept: ENT CLINIC | Age: 60
End: 2020-11-09

## 2020-11-09 NOTE — TELEPHONE ENCOUNTER
Call patient with results of MRI-no CPA lesions identified, he is cleared for hearing aids. He will follow-up with Dr. Brenda Collier for hearing aid evaluation and fittings. Zack Li

## 2020-11-15 NOTE — PROGRESS NOTES
Emily Garner  [birth year] y.o. male   [id number]      HEARING AID EVALUATION    Emily Garner was seen today, 11/19/2020 , for a Hearing Aid Evaluation following audiologic evaluation on 10/9/2020. Patient has no prior history of hearing aid use. Patient was found to have insurance benefit of up to 80% of cost of devices for hearing aids after deductible is met. Per insurance benefit check, patient also has $1,277.55 left to meet out-of-pocket expenses limit of $3,000.00. Patient understand he is responsible for any cost of devices insurance does not cover. Hearing aid benefit worksheet scanned into media tab. DATE: 11/19/2020     PROCEDURES:     SOUNDFIELD TESTING:     Name of test Type of amplification Level of signal Level of noise % Correct        Nu-6  None 55dBHL N/A 96%        Nu-6  None 45dBHL N/A 76%    QuickSIN None 70dBHL varied 3.0 SNR loss       LIFESTYLE EVALUATION:      How do you spend most of your day? 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 Overall feels he hears conversation in hospital, but notes difficulty with distance and background noise. Patient also reports difficulty in group settings with larger and smaller groups. Notably, patient attends indoor and outdoor concerts. He states he hears outdoor concerts well, but has difficulty hearing when there is talking in-between songs; especially indoors. Which ear do you use on the phone? Right  (notes difficulty on phone at work)        Landline or cellphone  Both     Has anyone recommended you wear HAs before? No      If yes, have you tried HAs? If no, why not? Do you feel like your hearing loss limits or hampers your personal or social life in any way? No    Does a hearing problem cause you to feel embarrassed when you meet new people? Yes    How do you compensate for things you miss or misunderstand?   Ask to repeat but sometimes if he misses it he will pretend he heard it. Does a hearing problem cause you to feel frustrated when talking to members of your family? No      Do they get frustrated with you? No    How does it make you feel when you miss things? Not good because worried people think he is not paying attention. Do you feel handicapped by your hearing problem? Yes    Does your hearing problem cause you difficulty when visiting friends, relatives, or neighbors? Yes    Does your hearing problem cause you to attend [employer] services or group meetings less often than you would like? Yes    Provide a guestimate of the % of the service or meeting that you hear and understand clearly  %    Does your hearing problem cause you difficulty when listening to a TV or radio? Yes    Do others feel that your TV/radio is too loud? Yes    Does a hearing problem cause you difficulty when in a restaurant with relatives or friends? Yes    What % of conversation do you feel that you hear in groups/restaurants? 75-85%    When is your hearing loss most problematic? Using the phone and group settings. In what situation would you most like to hear better? 1. Work (conversations and phone)  2. Social    Do you want to be able to connect directly to your phone with your hearing aids? Android- Yes    After a discussion of evaluation results, discussion of levels of technology and prices, and lifestyle assessment, Elisa Garcia has decided to pursue hearing aids. Technology to be ordered: Emiliano TOUSSAINT Picked color P5,  power 2(M), large [facility], . Signed medical purchase agreement. Patient cost due at time of fitting: $980.00 of total $4,900.00 to billed to insurance (expected insurance benefit of up to 80% of cost of devices). PATIENT EDUCATION:      - Verbally reviewed benefits and limitations of devices and available features in hearing aids.     - Verbally discussed realistic expectations of hearing aid use     Information was shared verbally with patient during appointment. RECOMMENDATIONS:      - Return as scheduled to be fit with hearing aids. - Contact audiologist with questions/concerns as needed. No charge for today's appointment.     Cathy Clifford  Audiologist

## 2020-11-17 ENCOUNTER — TELEPHONE (OUTPATIENT)
Dept: AUDIOLOGY | Age: 60
End: 2020-11-17

## 2020-11-17 NOTE — TELEPHONE ENCOUNTER
Patient called to talk to Dr. Ferdinand Dance about his Hearing Aid benefit before his appointment. Best number is [phone]. Patient is at work so might not be able to answer.

## 2020-11-19 ENCOUNTER — PROCEDURE VISIT (OUTPATIENT)
Dept: AUDIOLOGY | Age: 60
End: 2020-11-19

## 2020-11-19 VITALS — TEMPERATURE: 96.4 F

## 2020-11-19 PROCEDURE — 99999 PR OFFICE/OUTPT VISIT,PROCEDURE ONLY: CPT | Performed by: AUDIOLOGIST

## 2020-11-22 NOTE — PROGRESS NOTES
Jake Cespedes   1960, 62 y.o. male   [id number]     HEARING AID FITTING      RIGHT EAR: /[employer]  SN: [id number]  EARMOLD/TUBING/WIRE/DOME: 2(M) with large open domes    LEFT EAR: /[employer]  SN: [id number]  EARMOLD/TUBING/WIRE/DOME: 2(M) with large open domes    HAF: 2020  30 DAY RIGHT-TO-RETURN: 2020  WARRANTY: 2023    BUTTONS: Disabled (tap-control for phone calls activated)  PROGRAMS: Auto-Sense  PHONE CONNECTIVITY: Devices connected to [employer]. Jake Cespedes was seen today, 2020   to be fit with [employer] P70-R hearing aids. Patient fit with 2(M) receivers and large open domes which appear to be a good fit. Programmed to 100% target gain. Counseled patient on use and care of devices and on realistic expectations. Device orientation was completed, includin. Hearing aid insertion/removal   2. Buttons/Indicators   3. How to charge devices     4. Cleaning/maintenance of the device     5. Loss & Damage/Repair warranty information   6. 30-day right-to-return period    Jake Cespedes noted good sound quality and comfort with hearing aids. Patient demonstrated proper insertion and removal of devices in office. Paired devices to patient's Android phone and downloaded [employer] tiesha. Patient demonstrated he was able to answer and use devices for phone calls in office. Completed delivery statement and reviewed 30 day trial period and recall process for programming adjustments. PATIENT EDUCATION:       Jake Cespedes was provided with the Hearing Aid Fitting Checklist as a reference of items discussed at today's appointment. Patient may find additional product information in the provided hearing aid  device manual.    Information was shared verbally with patient during appointment.     RECOMMENDATIONS:     - Return for follow-up appointment within 30-day right-to-return period.  - Continue wearing both HAs during all waking hours. - Retest hearing as medically indicated and/or sooner if a change in hearing is noted. - Contact audiologist with questions/concerns as needed. Patient paid $980.00 of total $4,900.00 billed to insurance. (Expected insurance benefit of up to 80% of cost of devices after deductible is met).      Unbundled Billing- see associated fees and codes below:          Cathy Wang  Audiologist

## 2020-11-27 ENCOUNTER — PROCEDURE VISIT (OUTPATIENT)
Dept: AUDIOLOGY | Age: 60
End: 2020-11-27
Payer: COMMERCIAL

## 2020-11-27 PROCEDURE — V5261 HEARING AID, DIGIT, BIN, BTE: HCPCS | Performed by: AUDIOLOGIST

## 2020-11-27 PROCEDURE — V5020 CONFORMITY EVALUATION: HCPCS | Performed by: AUDIOLOGIST

## 2020-11-27 PROCEDURE — V5265 EAR MOLD/INSERT, DISP: HCPCS | Performed by: AUDIOLOGIST

## 2020-11-27 PROCEDURE — V5011 HEARING AID FITTING/CHECKING: HCPCS | Performed by: AUDIOLOGIST

## 2020-11-27 PROCEDURE — V5160 DISPENSING FEE BINAURAL: HCPCS | Performed by: AUDIOLOGIST

## 2020-12-10 ENCOUNTER — PROCEDURE VISIT (OUTPATIENT)
Dept: AUDIOLOGY | Age: 60
End: 2020-12-10

## 2020-12-10 VITALS — TEMPERATURE: 97.6 F

## 2020-12-10 PROCEDURE — 99999 PR OFFICE/OUTPT VISIT,PROCEDURE ONLY: CPT | Performed by: AUDIOLOGIST

## 2020-12-30 ENCOUNTER — PROCEDURE VISIT (OUTPATIENT)
Dept: AUDIOLOGY | Age: 60
End: 2020-12-30

## 2020-12-30 PROCEDURE — 99999 PR OFFICE/OUTPT VISIT,PROCEDURE ONLY: CPT | Performed by: AUDIOLOGIST

## 2021-02-22 RX ORDER — ATORVASTATIN CALCIUM 80 MG/1
TABLET, FILM COATED ORAL
Qty: 90 TABLET | Refills: 1 | Status: SHIPPED | OUTPATIENT
Start: 2021-02-22 | End: 2021-08-19 | Stop reason: SDUPTHER

## 2021-03-31 ENCOUNTER — TELEPHONE (OUTPATIENT)
Dept: FAMILY MEDICINE CLINIC | Age: 61
End: 2021-03-31

## 2021-03-31 NOTE — TELEPHONE ENCOUNTER
Spoke with Dat Beck. All symptoms have resolved today. No new complaints. Reassured. If new issues, call or return to clinic. Patient understands and agrees with plan. All questions were answered.

## 2021-04-11 NOTE — PROGRESS NOTES
Brooke Ashby  [birth year]83 y.o. male   [id number]    HEARING AID CHECK    Brooke Ashby was seen today, 4/15/2021, for a hearing aid check appointment    PROCEDURES:     Otoscopy revealed: Clear ear canals bilaterally    Patient reported overall satisfaction with devices as he states he is hearing well at work; however notes sound can sometimes seem loud at home. Counseled patient on use of [employer]. Hearing aids were cleaned and checked. A listening check revealed good function of the devices. Microphone and  ports were suctioned, domes and wax traps were changed, and devices completed a desiccant cycle through [employer]. Post-cleaning listening check revealed good function of the devices. Connected devices to fitting software. Datalogging revealed 13 hours of use per day. PATIENT EDUCATION:     - Verbally and visually reviewed importance of consistent use and care and maintenance of devices. Information was verbally shared with patient during appointment. RECOMMENDATIONS:     - Continue consistent hearing aid use  - Return for hearing aid checks as needed  - Retest hearing as medically indicated and/or sooner if a change in hearing is noted. - Contact audiologist with questions/concerns as needed. **No charge for today's appointment; patient under service warranty through 11/27/2021.      Cathy Lange  Audiologist

## 2021-04-15 ENCOUNTER — PROCEDURE VISIT (OUTPATIENT)
Dept: AUDIOLOGY | Age: 61
End: 2021-04-15

## 2021-04-15 DIAGNOSIS — H90.3 SENSORINEURAL HEARING LOSS, BILATERAL: Primary | ICD-10-CM

## 2021-04-15 PROCEDURE — 99999 PR OFFICE/OUTPT VISIT,PROCEDURE ONLY: CPT | Performed by: AUDIOLOGIST

## 2021-08-19 RX ORDER — ATORVASTATIN CALCIUM 80 MG/1
80 TABLET, FILM COATED ORAL DAILY
Qty: 90 TABLET | Refills: 1 | Status: SHIPPED | OUTPATIENT
Start: 2021-08-19 | End: 2022-02-15

## 2021-08-19 NOTE — TELEPHONE ENCOUNTER
----- Message from [facility] sent at 8/19/2021  9:43 AM EDT -----  Subject: Refill Request    QUESTIONS  Name of Medication? atorvastatin (LIPITOR) 80 MG tablet  Patient-reported dosage and instructions? 80 MG 1 per day  How many days do you have left? 21  Preferred Pharmacy? [id number]  Pharmacy phone number (if available)? [phone]  ---------------------------------------------------------------------------  --------------  Rea VICTOR  What is the best way for the office to contact you? OK to leave message on   voicemail, OK to respond with electronic message via [employer] portal (only   for patients who have registered [employer] account)  Preferred Call Back Phone Number?  [phone]

## 2021-08-20 NOTE — PROGRESS NOTES
Quinton Conner  [birth year]24 y.o. male   [id number]    HEARING AID CHECK    Quinton Conner was seen today, 8/25/2021, for a hearing aid check appointment     PROCEDURES:     Otoscopy revealed: Clear ear canals bilaterally    Hearing aids were cleaned and checked. A listening check revealed good function of the devices. Microphone and  ports were suctioned, domes and wax traps were changed, and devices completed a desiccant cycle through [facility]. Post-cleaning listening check revealed good function of the devices. Connected devices to fitting software. Datalogging revealed 14 hours of use per day. PATIENT EDUCATION:     - Verbally and visually reviewed importance of consistent use and care and maintenance of devices. Information was verbally shared with patient during appointment. RECOMMENDATIONS:     - Continue consistent hearing aid use  - Return for hearing aid checks as needed  - Retest hearing as medically indicated and/or sooner if a change in hearing is noted. - Contact audiologist with questions/concerns as needed    **No charge for today's appointment; patient under service warranty through 11/27/21.        Cathy Gale  Audiologist

## 2021-08-25 ENCOUNTER — PROCEDURE VISIT (OUTPATIENT)
Dept: AUDIOLOGY | Age: 61
End: 2021-08-25

## 2021-08-25 DIAGNOSIS — H90.3 SENSORINEURAL HEARING LOSS, BILATERAL: Primary | ICD-10-CM

## 2021-08-25 PROCEDURE — 99999 PR OFFICE/OUTPT VISIT,PROCEDURE ONLY: CPT | Performed by: AUDIOLOGIST

## 2021-10-21 ENCOUNTER — TELEPHONE (OUTPATIENT)
Dept: FAMILY MEDICINE CLINIC | Age: 61
End: 2021-10-21

## 2021-10-21 NOTE — TELEPHONE ENCOUNTER
With Dr. Mikala Jackson retiring, patient would like to establish with you. Due now for a yearly check up. Will you accept?

## 2021-11-05 ENCOUNTER — OFFICE VISIT (OUTPATIENT)
Dept: FAMILY MEDICINE CLINIC | Age: 61
End: 2021-11-05
Payer: COMMERCIAL

## 2021-11-05 VITALS
DIASTOLIC BLOOD PRESSURE: 70 MMHG | OXYGEN SATURATION: 97 % | WEIGHT: 180.6 LBS | HEIGHT: 72 IN | RESPIRATION RATE: 18 BRPM | BODY MASS INDEX: 24.46 KG/M2 | SYSTOLIC BLOOD PRESSURE: 122 MMHG | HEART RATE: 83 BPM

## 2021-11-05 DIAGNOSIS — Z00.00 ANNUAL PHYSICAL EXAM: Primary | ICD-10-CM

## 2021-11-05 PROBLEM — M65.312 TRIGGER FINGER OF LEFT THUMB: Status: RESOLVED | Noted: 2019-09-19 | Resolved: 2021-11-05

## 2021-11-05 PROBLEM — G89.29 CHRONIC PAIN OF LEFT THUMB: Status: RESOLVED | Noted: 2019-08-06 | Resolved: 2021-11-05

## 2021-11-05 PROBLEM — H90.3 SENSORINEURAL HEARING LOSS, BILATERAL: Status: RESOLVED | Noted: 2020-10-09 | Resolved: 2021-11-05

## 2021-11-05 PROBLEM — Z87.74 S/P PATENT FORAMEN OVALE CLOSURE: Status: RESOLVED | Noted: 2020-06-25 | Resolved: 2021-11-05

## 2021-11-05 PROBLEM — I63.411 CEREBROVASCULAR ACCIDENT (CVA) DUE TO EMBOLISM OF RIGHT MIDDLE CEREBRAL ARTERY (HCC): Status: RESOLVED | Noted: 2020-06-25 | Resolved: 2021-11-05

## 2021-11-05 PROBLEM — M65.311 TRIGGER FINGER OF RIGHT THUMB: Status: RESOLVED | Noted: 2019-12-05 | Resolved: 2021-11-05

## 2021-11-05 PROBLEM — M79.645 CHRONIC PAIN OF LEFT THUMB: Status: RESOLVED | Noted: 2019-08-06 | Resolved: 2021-11-05

## 2021-11-05 LAB
A/G RATIO: 2 (ref 1.1–2.2)
ALBUMIN SERPL-MCNC: 4.4 G/DL (ref 3.4–5)
ALP BLD-CCNC: 102 U/L (ref 40–129)
ALT SERPL-CCNC: 27 U/L (ref 10–40)
ANION GAP SERPL CALCULATED.3IONS-SCNC: 13 MMOL/L (ref 3–16)
AST SERPL-CCNC: 32 U/L (ref 15–37)
BILIRUB SERPL-MCNC: 0.5 MG/DL (ref 0–1)
BUN BLDV-MCNC: 16 MG/DL (ref 7–20)
CALCIUM SERPL-MCNC: 9.2 MG/DL (ref 8.3–10.6)
CHLORIDE BLD-SCNC: 106 MMOL/L (ref 99–110)
CHOLESTEROL, TOTAL: 114 MG/DL (ref 0–199)
CO2: 24 MMOL/L (ref 21–32)
CREAT SERPL-MCNC: 0.9 MG/DL (ref 0.8–1.3)
GFR AFRICAN AMERICAN: >60
GFR NON-AFRICAN AMERICAN: >60
GLUCOSE BLD-MCNC: 98 MG/DL (ref 70–99)
HDLC SERPL-MCNC: 45 MG/DL (ref 40–60)
HEPATITIS C ANTIBODY INTERPRETATION: NORMAL
LDL CHOLESTEROL CALCULATED: 58 MG/DL
POTASSIUM SERPL-SCNC: 4.2 MMOL/L (ref 3.5–5.1)
PROSTATE SPECIFIC ANTIGEN: 0.57 NG/ML (ref 0–4)
SODIUM BLD-SCNC: 143 MMOL/L (ref 136–145)
TOTAL PROTEIN: 6.6 G/DL (ref 6.4–8.2)
TRIGL SERPL-MCNC: 56 MG/DL (ref 0–150)
VLDLC SERPL CALC-MCNC: 11 MG/DL

## 2021-11-05 PROCEDURE — 36415 COLL VENOUS BLD VENIPUNCTURE: CPT | Performed by: PHYSICIAN ASSISTANT

## 2021-11-05 PROCEDURE — 99396 PREV VISIT EST AGE 40-64: CPT | Performed by: PHYSICIAN ASSISTANT

## 2021-11-05 ASSESSMENT — PATIENT HEALTH QUESTIONNAIRE - PHQ9
2. FEELING DOWN, DEPRESSED OR HOPELESS: 0
1. LITTLE INTEREST OR PLEASURE IN DOING THINGS: 0
SUM OF ALL RESPONSES TO PHQ QUESTIONS 1-9: 0
SUM OF ALL RESPONSES TO PHQ9 QUESTIONS 1 & 2: 0

## 2021-11-05 NOTE — PROGRESS NOTES
History and Physical      Александр Matthews  YOB: 1960    Date of Service:  11/5/2021    Chief Complaint:   Александр Matthews is a 56 y.o. male who presents for complete physical examination. HPI: Overall feeling well.      Wt Readings from Last 3 Encounters:   11/05/21 180 lb 9.6 oz (81.9 kg)   10/15/20 179 lb 12.8 oz (81.6 kg)   07/22/20 173 lb (78.5 kg)     BP Readings from Last 3 Encounters:   11/05/21 122/70   10/15/20 109/66   07/22/20 119/73       Patient Active Problem List   Diagnosis    Acute pancreatitis    Hypokalemia    Dog bite of face    Nodular prostate without urinary obstruction    Disorder of prostate    Chronic pain of left thumb    Trigger finger of left thumb    Trigger finger of right thumb    S/P patent foramen ovale closure    Cerebrovascular accident (CVA) due to embolism of right middle cerebral artery ([address]    Sensorineural hearing loss, bilateral       Preventive Care:  Health Maintenance   Topic Date Due    Hepatitis C screen  Never done    HIV screen  Never done    Lipid screen  06/25/2021    Flu vaccine (1) 09/01/2021    COVID-19 Vaccine (3 - Pfizer booster) 09/30/2021    DTaP/Tdap/Td vaccine (2 - Td or Tdap) 07/23/2025    Colon cancer screen colonoscopy  10/11/2031    Shingles Vaccine  Completed    Hepatitis A vaccine  Aged Out    Hepatitis B vaccine  Aged Out    Hib vaccine  Aged Out    Meningococcal (ACWY) vaccine  Aged Out    Pneumococcal 0-64 years Vaccine  Aged Out     Self-testicular exams: Yes  Sexual activity: single partner  Last eye exam: 2021  Exercise: yes  Seatbelt use: yes  Lipid panel:    Lab Results   Component Value Date    TRIG 74 06/25/2020    HDL 42 06/25/2020    HDL 40 04/27/2010    LDLCALC 62 06/25/2020     Living will: yes,   copy requested    Immunization History   Administered Date(s) Administered    COVID-19, Pfizer, PF, 30mcg/0.3mL 03/09/2021, 03/30/2021    Influenza, MDCK Quadv, with preserv IM (Flucelvax 2 yrs and older) 09/28/2020    Influenza, Quadv, IM, (6 mo and older Fluzone, Flulaval, Fluarix and 3 yrs and older Afluria) 01/04/2019    Influenza, Quadv, IM, PF (6 mo and older Fluzone, Flulaval, Fluarix, and 3 yrs and older Afluria) 01/04/2019, 11/06/2019    Td vaccine (adult) 10/26/2001    Tdap (Boostrix, Adacel) 07/23/2015    Zoster Recombinant (Shingrix) 06/30/2020, 09/28/2020       Allergies   Allergen Reactions    Hydrocodone-Acetaminophen Other (See Comments)     Fatigue and confusion.  Vicodin [Hydrocodone-Acetaminophen] Other (See Comments)     Fatigue and confusion. Current Outpatient Medications   Medication Sig Dispense Refill    atorvastatin (LIPITOR) 80 MG tablet Take 1 tablet by mouth daily 90 tablet 1    aspirin 81 MG EC tablet TAKE 1 TABLET (81 MG TOTAL) BY MOUTH DAILY WITH BREAKFAST.  3     No current facility-administered medications for this visit.        Past Medical History:   Diagnosis Date    Cerebral artery occlusion with cerebral infarction ([address]     TIA    Hearing loss     Hepatitis A     Hyperlipidemia      Past Surgical History:   Procedure Laterality Date    BACK SURGERY      CARDIAC SURGERY      cath - repair PFO    CHOLECYSTECTOMY      COLONOSCOPY  2007    FINGER TRIGGER RELEASE Right 7/22/2020    RIGHT TRIGGER THUMB RELEASE performed by Jayshree Brand MD at [address] [place] [address]       Family History   Problem Relation Age of Onset    Stroke Father     Breast Cancer Sister     Colon Cancer Brother      Social History     Socioeconomic History    Marital status: Single     Spouse name: Not on file    Number of children: Not on file    Years of education: Not on file    Highest education level: Not on file   Occupational History    Not on file   Tobacco Use    Smoking status: Never Smoker    Smokeless tobacco: Never Used   Vaping Use    Vaping Use: Never used   Substance and Sexual Activity    Alcohol use: No    Drug use: No    Sexual activity: Yes     Partners: Male   Other Topics Concern    Not on file   Social History Narrative    Not on file     Social Determinants of Health     Financial Resource Strain:     Difficulty of Paying Living Expenses:    Food Insecurity:     Worried About Running Out of Food in the Last Year:     [address] in the Last Year:    Transportation Needs:     Lack of Transportation (Medical):  Lack of Transportation (Non-Medical):    Physical Activity:     Days of Exercise per Week:     Minutes of Exercise per Session:    Stress:     Feeling of Stress :    Social Connections:     Frequency of Communication with Friends and Family:     Frequency of Social Gatherings with Friends and Family:     Attends Christianity Services:     Active Member of Clubs or Organizations:     Attends Club or Organization Meetings:     Marital Status:    Intimate Partner Violence:     Fear of Current or Ex-Partner:     Emotionally Abused:     Physically Abused:     Sexually Abused:        Review of Systems:  A comprehensive review of systems was negative except for what was noted in the HPI. Physical Exam:   Vitals:    11/05/21 0751   BP: 122/70   Pulse: 83   Resp: 18   SpO2: 97%   Weight: 180 lb 9.6 oz (81.9 kg)   Height: 6' (1.829 m)     Body mass index is 24.49 kg/m². Constitutional: He is oriented to person, place, and time. He appears well-developed and well-nourished. No distress. HEENT:   Head: Normocephalic and atraumatic. Right Ear: Tympanic membrane, external ear and ear canal normal.   Left Ear: Tympanic membrane, external ear and ear canal normal.   Nose: Nose normal.   Mouth/Throat: Oropharynx is clear and moist and mucous membranes are normal. No oropharyngeal exudate or posterior oropharyngeal erythema. He has no cervical adenopathy. Eyes: Conjunctivae and extraocular motions are normal. Pupils are equal, round, and reactive to light. Neck: Full passive range of motion without pain.  Neck supple. No JVD present. Carotid bruit is not present. No mass and no thyromegaly present. Cardiovascular: Normal rate, regular rhythm, normal heart sounds and intact distal pulses. Exam reveals no gallop and no friction rub. No murmur heard. Pulmonary/Chest: Effort normal and breath sounds normal. No respiratory distress. He has no wheezes, rhonchi or rales. Abdominal: Soft, non-tender. Bowel sounds and aorta are normal. There is no organomegaly, mass or bruit. Musculoskeletal: Normal range of motion, no synovitis. He exhibits no edema. Neurological: He is alert and oriented to person, place, and time. He has normal reflexes. No cranial nerve deficit. Coordination normal.   Skin: Skin is warm, dry and intact. No suspicious lesions are noted. Psychiatric: He has a normal mood and affect. His speech is normal and behavior is normal. Judgment, cognition and memory are normal.         Assessment/Plan:     Diagnosis Orders   1.  Annual physical exam  Lipid Panel    Comprehensive Metabolic Panel    Hepatitis C Antibody    PSA screening

## 2021-11-15 NOTE — PROGRESS NOTES
Matteo Villagran  [birth year]25 y.o. male   [id number]    HEARING AID CHECK    Matteo Villagran was seen today, 11/17/2021, for a hearing aid check appointment    PROCEDURES:     Otoscopy revealed: Clear ear canals bilaterally    Patient noted overall benefit and consistent use with devices. Hearing aids were cleaned and checked. A listening check revealed good function of the devices. Microphone and  ports were suctioned, domes and wax traps were changed, and devices completed a desiccant cycle through [employer]. Post-cleaning listening check revealed good function of the devices. Connected devices to fitting software. Datalogging revealed 15 hours of use per day. PATIENT EDUCATION:     - Verbally and visually reviewed importance of consistent use and care and maintenance of devices. Information was verbally shared with patient during appointment. RECOMMENDATIONS:     - Continue consistent hearing aid use  - Return for hearing aid checks as needed  - Retest hearing as medically indicated and/or sooner if a change in hearing is noted. - Contact audiologist with questions/concerns as needed    **No charge for today's appointment; patient under service warranty through 11/27/21. **Explained to patient this is last appointment included in service warranty and there will be a charge for next office visit. Patient reported understanding.        Cathy Lopez  Audiologist

## 2021-11-17 ENCOUNTER — PROCEDURE VISIT (OUTPATIENT)
Dept: AUDIOLOGY | Age: 61
End: 2021-11-17

## 2021-11-17 DIAGNOSIS — H90.3 SENSORINEURAL HEARING LOSS, BILATERAL: Primary | ICD-10-CM

## 2021-11-17 PROCEDURE — 99999 PR OFFICE/OUTPT VISIT,PROCEDURE ONLY: CPT | Performed by: AUDIOLOGIST

## 2022-02-15 RX ORDER — ATORVASTATIN CALCIUM 80 MG/1
TABLET, FILM COATED ORAL
Qty: 90 TABLET | Refills: 1 | Status: SHIPPED | OUTPATIENT
Start: 2022-02-15 | End: 2022-08-22

## 2022-02-15 NOTE — TELEPHONE ENCOUNTER
Paola Tang [phone] (home)    is requesting refill(s) of medication Atorvastatin to preferred pharmacy [facility]    Last OV 11/5/21 (pertaining to medication)   Last refill 8/19/21 (per medication requested)  Next office visit scheduled or attempted Yes  Date   If No, reason

## 2022-05-12 NOTE — PROGRESS NOTES
Tiffanie Posey  [birth year] y.o. male   [id number]    HEARING AID CHECK    Tiffanie Posey was seen today, 5/18/2022, for a hearing aid check appointment    PROCEDURES:     Otoscopy revealed: Clear ear canals bilaterally    Patient noted overall benefit and satisfaction with devices. Hearing aids were cleaned and checked. A listening check revealed good function of the devices. Microphone and  ports were suctioned, domes and wax traps were changed, and devices completed a desiccant cycle through [employer] Post-cleaning listening check revealed good function of the devices. Connected devices to fitting software. Datalogging revealed 15 hours of use per day. PATIENT EDUCATION:     - Verbally and visually reviewed importance of consistent use and care and maintenance of devices. Information was verbally shared with patient during appointment. RECOMMENDATIONS:     - Continue consistent hearing aid use  - Return for hearing aid checks as needed  - Retest hearing as medically indicated and/or sooner if a change in hearing is noted. - Contact audiologist with questions/concerns as needed      **Patient paid $60.00 hearing aid check fee for today's appointment.     Cathy Mcdonnell  Audiologist

## 2022-05-18 ENCOUNTER — PROCEDURE VISIT (OUTPATIENT)
Dept: AUDIOLOGY | Age: 62
End: 2022-05-18

## 2022-05-18 DIAGNOSIS — H90.3 SENSORINEURAL HEARING LOSS, BILATERAL: Primary | ICD-10-CM

## 2022-05-18 PROCEDURE — 92593 PR HEARING AID CHECK, BOTH EARS: CPT | Performed by: AUDIOLOGIST

## 2022-08-22 RX ORDER — ATORVASTATIN CALCIUM 80 MG/1
TABLET, FILM COATED ORAL
Qty: 30 TABLET | Refills: 0 | Status: SHIPPED | OUTPATIENT
Start: 2022-08-22 | End: 2022-08-30 | Stop reason: SDUPTHER

## 2022-08-22 NOTE — TELEPHONE ENCOUNTER
Shawna Duty [phone] (home)    is requesting refill(s) of medication Atorvastatin to preferred pharmacy [facility]    Last OV 11/5/21 (pertaining to medication)   Last refill 2/15/22 (per medication requested)  Next office visit scheduled or attempted No  Date   If No, reason Pt was due in May.  Left  for pt to return call

## 2022-08-25 NOTE — TELEPHONE ENCOUNTER
Pt called back stating he got a call regarding a prescription so he is calling back. Advised pt it looked like atorvastatin was refilled and sent in to pharmacy, unsure if pt needs to schedule appt or not? Advised pt I would send message back to [facility] to double check if she needed anything and have her call him back tomorrow.

## 2022-08-30 ENCOUNTER — OFFICE VISIT (OUTPATIENT)
Dept: FAMILY MEDICINE CLINIC | Age: 62
End: 2022-08-30
Payer: COMMERCIAL

## 2022-08-30 VITALS
SYSTOLIC BLOOD PRESSURE: 100 MMHG | DIASTOLIC BLOOD PRESSURE: 60 MMHG | OXYGEN SATURATION: 99 % | WEIGHT: 182.6 LBS | HEIGHT: 72 IN | HEART RATE: 87 BPM | BODY MASS INDEX: 24.73 KG/M2

## 2022-08-30 DIAGNOSIS — E78.2 MIXED HYPERLIPIDEMIA: Primary | ICD-10-CM

## 2022-08-30 LAB
A/G RATIO: 2 (ref 1.1–2.2)
ALBUMIN SERPL-MCNC: 4.5 G/DL (ref 3.4–5)
ALP BLD-CCNC: 94 U/L (ref 40–129)
ALT SERPL-CCNC: 25 U/L (ref 10–40)
ANION GAP SERPL CALCULATED.3IONS-SCNC: 10 MMOL/L (ref 3–16)
AST SERPL-CCNC: 22 U/L (ref 15–37)
BILIRUB SERPL-MCNC: 0.6 MG/DL (ref 0–1)
BUN BLDV-MCNC: 13 MG/DL (ref 7–20)
CALCIUM SERPL-MCNC: 9.5 MG/DL (ref 8.3–10.6)
CHLORIDE BLD-SCNC: 104 MMOL/L (ref 99–110)
CHOLESTEROL, TOTAL: 124 MG/DL (ref 0–199)
CO2: 25 MMOL/L (ref 21–32)
CREAT SERPL-MCNC: 0.8 MG/DL (ref 0.8–1.3)
GFR AFRICAN AMERICAN: >60
GFR NON-AFRICAN AMERICAN: >60
GLUCOSE BLD-MCNC: 96 MG/DL (ref 70–99)
HDLC SERPL-MCNC: 41 MG/DL (ref 40–60)
LDL CHOLESTEROL CALCULATED: 59 MG/DL
POTASSIUM SERPL-SCNC: 4 MMOL/L (ref 3.5–5.1)
SODIUM BLD-SCNC: 139 MMOL/L (ref 136–145)
TOTAL PROTEIN: 6.7 G/DL (ref 6.4–8.2)
TRIGL SERPL-MCNC: 119 MG/DL (ref 0–150)
VLDLC SERPL CALC-MCNC: 24 MG/DL

## 2022-08-30 PROCEDURE — 99213 OFFICE O/P EST LOW 20 MIN: CPT | Performed by: PHYSICIAN ASSISTANT

## 2022-08-30 RX ORDER — ATORVASTATIN CALCIUM 80 MG/1
TABLET, FILM COATED ORAL
Qty: 90 TABLET | Refills: 1 | Status: SHIPPED | OUTPATIENT
Start: 2022-08-30

## 2022-08-30 NOTE — PROGRESS NOTES
Subjective:   Rodrigo Sousa is a 59 y.o. male with hyperlipidemia. Current Outpatient Medications   Medication Sig Dispense Refill    atorvastatin (LIPITOR) 80 MG tablet TAKE 1 TABLET BY MOUTH EVERY DAY 90 tablet 1    aspirin 81 MG EC tablet TAKE 1 TABLET (81 MG TOTAL) BY MOUTH DAILY WITH BREAKFAST.  3     No current facility-administered medications for this visit. Cardiovascular risk analysis - 59 y.o. male LDL goal is under 100. ROS: taking medications as instructed, no medication side effects noted, no TIA's, no chest pain on exertion, no dyspnea on exertion, no swelling of ankles. New concerns: none today, feeling well. Objective:   /60   Pulse 87   Ht 6' (1.829 m)   Wt 182 lb 9.6 oz (82.8 kg)   SpO2 99%   BMI 24.77 kg/m²    Appearance alert, well appearing, and in no distress and normal appearing weight. General exam BP noted to be well controlled today in office, S1, S2 normal, no gallop, no murmur, chest clear, no JVD, no HSM, no edema. Assessment/Plan:     Diagnosis Orders   1.  Mixed hyperlipidemia  Lipid Panel    Comprehensive Metabolic Panel  Continue lipitor 80mg

## 2022-11-15 NOTE — PROGRESS NOTES
Tushar Messina  [birth year]86 y.o. male   [id number]    HEARING AID CHECK    Tushar Messina was seen today, 11/16/2022, for a hearing aid check appointment    PROCEDURES:     Otoscopy revealed: Clear ear canals bilaterally    Patient noted overall benefit with devices-sometimes miss beginning of conversation. Hearing aids were cleaned and checked. A listening check revealed good function of the devices. Microphone and  ports were suctioned, domes and wax traps were changed, and devices completed a desiccant cycle through [employer]. Post-cleaning listening check revealed good function of the devices. Connected devices to fitting software. Datalogging revealed 15 hours of use per day. Changed large open to large vented dome. Speechmapping good match to targets. After adjustments, patient reported comfort and good sound quality. PATIENT EDUCATION:     - Verbally and visually reviewed importance of consistent use and care and maintenance of devices. Information was verbally shared with patient during appointment. RECOMMENDATIONS:     Continue consistent hearing aid use  Return for hearing aid checks as needed  Retest hearing as medically indicated and/or sooner if a change in hearing is noted. Contact audiologist with questions/concerns as needed      **Patient paid $60.00 for today's appointment.     [facility] [place], Cathy  Audiologist

## 2022-11-16 ENCOUNTER — PROCEDURE VISIT (OUTPATIENT)
Dept: AUDIOLOGY | Age: 62
End: 2022-11-16
Payer: COMMERCIAL

## 2022-11-16 DIAGNOSIS — H90.3 SENSORINEURAL HEARING LOSS, BILATERAL: Primary | ICD-10-CM

## 2022-11-16 PROCEDURE — 92593 PR HEARING AID CHECK, BOTH EARS: CPT | Performed by: AUDIOLOGIST

## 2023-01-04 ENCOUNTER — TELEMEDICINE (OUTPATIENT)
Dept: PRIMARY CARE CLINIC | Age: 63
End: 2023-01-04
Payer: COMMERCIAL

## 2023-01-04 DIAGNOSIS — J06.9 VIRAL URI WITH COUGH: Primary | ICD-10-CM

## 2023-01-04 DIAGNOSIS — R10.32 INTERMITTENT LEFT LOWER QUADRANT ABDOMINAL PAIN: ICD-10-CM

## 2023-01-04 PROCEDURE — 99213 OFFICE O/P EST LOW 20 MIN: CPT | Performed by: NURSE PRACTITIONER

## 2023-01-04 RX ORDER — SOD CHLOR,BICARB/SQUEEZ BOTTLE
1 PACKET, WITH RINSE DEVICE NASAL 2 TIMES DAILY
Qty: 1 KIT | Refills: 0 | Status: SHIPPED | OUTPATIENT
Start: 2023-01-04

## 2023-01-04 RX ORDER — BENZONATATE 200 MG/1
200 CAPSULE ORAL 3 TIMES DAILY PRN
Qty: 30 CAPSULE | Refills: 0 | Status: SHIPPED | OUTPATIENT
Start: 2023-01-04 | End: 2023-01-14

## 2023-01-04 ASSESSMENT — ENCOUNTER SYMPTOMS
CONSTIPATION: 0
CHEST TIGHTNESS: 1
DIARRHEA: 0
ABDOMINAL PAIN: 1
SINUS PRESSURE: 0
NAUSEA: 0
RHINORRHEA: 1
COUGH: 1
VOMITING: 0
SINUS PAIN: 0

## 2023-01-04 NOTE — LETTER
I had the pleasure of seeing Rinku Smallwood today for a primary care Virtualist video visit. Our team would love your overall feedback on this visit. Please hit shift and click the following link to let us know if the Virtualist service met your expectations. [facility]. [email or web address]      Electronically signed by CRISTIAN Gutierrez CNP on 1/4/23 at 7:27 AM EST.

## 2023-01-04 NOTE — PROGRESS NOTES
Magdalena Cordero (:  1960) is a Established patient, here for evaluation of the following:    Cough (X 7 days), URI (X 7 days), and Abdominal Pain (X 3 days)       Assessment & Plan:  Below is the assessment and plan developed based on review of pertinent history, physical exam, labs, studies, and medications. 1. Viral URI with cough  -     benzonatate (TESSALON) 200 MG capsule; Take 1 capsule by mouth 3 times daily as needed for Cough, Disp-30 capsule, R-0Normal  -     Hypertonic Nasal Wash (SINUS RINSE) PACK; 1 kit by Nasal route 2 times daily, Disp-1 kit, R-0Normal  2. Intermittent left lower quadrant abdominal pain  Explained the need for in person evaluation for best evaluation and treatment of abdominal pain. DDx include constipation, muscle strain, hernia, diverticulitis. Recommend increased fiber, water, and probiotic. Advised to call his pcp and make an appt for in person evaluation if symptoms do not improve in the next two days with the recommendations or if they worsen. Patient verbalized understanding. The patient was educated on reasons to seek urgent medical care including chest pain, shortness of breath or difficulty breathing at rest, severe pain, fever >102 not controlled by medication, unrelenting vomiting or diarrhea, blue-tinged lips or nailbeds, and severe weakness or confusion. Patient verbalized understanding    . Return if symptoms worsen or fail to improve, for uri with cough, abdominal pain. Subjective: Patient had covid on 12/10/22 but his symptoms resolved after a week or so. Then started with a severe cough on 22. He feels like the cough is worse at night, uncontrollable. Has wheezing at the tailend of the coughing, higher pitched. Feels like theres a trigger in his throat and then it will continue for several mins before calming down. Cough  This is a new problem. The current episode started 1 to 4 weeks ago. The problem has been gradually improving.  The problem occurs every few minutes. The cough is Productive of sputum. Associated symptoms include postnasal drip and rhinorrhea. Pertinent negatives include no chest pain, fever or myalgias. Treatments tried: mucinex. URI   Associated symptoms include abdominal pain (lower left side), congestion, coughing and rhinorrhea. Pertinent negatives include no chest pain, diarrhea, nausea, sinus pain or vomiting. Review of Systems   Constitutional:  Negative for diaphoresis, fatigue and fever. HENT:  Positive for congestion, postnasal drip and rhinorrhea. Negative for sinus pressure and sinus pain. Respiratory:  Positive for cough and chest tightness (in bronchial area). Cardiovascular:  Negative for chest pain. Gastrointestinal:  Positive for abdominal pain (lower left side). Negative for constipation, diarrhea, nausea and vomiting. Musculoskeletal:  Negative for myalgias. Stomach pain:  Started 3 days ago, burns, pins and needles, 5/10 in intensity, constant in duration, worsens with coughing.    Objective:  Patient-Reported Vitals  No data recorded     Patient-Reported Vitals 1/3/2023   Patient-Reported Weight 178   Patient-Reported Height 6'   Patient-Reported Temperature 99.4        Physical Exam:  [INSTRUCTIONS:  \"[x]\" Indicates a positive item  \"[]\" Indicates a negative item  -- DELETE ALL ITEMS NOT EXAMINED]    Constitutional: [x] Appears well-developed and well-nourished [x] No apparent distress      [] Abnormal -     Mental status: [x] Alert and awake  [x] Oriented to person/place/time [x] Able to follow commands    [] Abnormal -     Eyes:   EOM    [x]  Normal    [] Abnormal -   Sclera  [x]  Normal    [] Abnormal -          Discharge [x]  None visible   [] Abnormal -     HENT: [x] Normocephalic, atraumatic  [] Abnormal -   [x] Mouth/Throat: Mucous membranes are moist    External Ears [x] Normal  [] Abnormal -    Neck: [x] No visualized mass [] Abnormal -     Pulmonary/Chest: [x] Respiratory effort normal   [x] No visualized signs of difficulty breathing or respiratory distress        [] Abnormal -      Musculoskeletal:   [] Normal gait with no signs of ataxia         [x] Normal range of motion of neck        [] Abnormal -     Neurological:        [x] No Facial Asymmetry (Cranial nerve 7 motor function) (limited exam due to video visit)          [x] No gaze palsy        [] Abnormal -          Skin:        [x] No significant exanthematous lesions or discoloration noted on facial skin         [] Abnormal -            Psychiatric:       [x] Normal Affect [] Abnormal -        [] No Hallucinations    Other pertinent observable physical exam findings:-        On this date 1/4/2023 I have spent 28 minutes reviewing previous notes, test results and face to face (virtual) with the patient discussing the diagnosis and importance of compliance with the treatment plan as well as documenting on the day of the visit. Ke Luo, was evaluated through a synchronous (real-time) audio-video encounter. The patient (or guardian if applicable) is aware that this is a billable service, which includes applicable co-pays. This Virtual Visit was conducted with patient's (and/or legal guardian's) consent. The visit was conducted pursuant to the emergency declaration under the [facility], [address] [place] [address] authority and the [employer] General Act. Patient identification was verified, and a caregiver was present when appropriate. The patient was located at Home: [address] [facility]  [address] [address].    Provider was located at Home ([facility]): [address] [facility], [facility]

## 2023-02-28 SDOH — ECONOMIC STABILITY: TRANSPORTATION INSECURITY
IN THE PAST 12 MONTHS, HAS LACK OF TRANSPORTATION KEPT YOU FROM MEETINGS, WORK, OR FROM GETTING THINGS NEEDED FOR DAILY LIVING?: NO

## 2023-02-28 SDOH — ECONOMIC STABILITY: FOOD INSECURITY: WITHIN THE PAST 12 MONTHS, YOU WORRIED THAT YOUR FOOD WOULD RUN OUT BEFORE YOU GOT MONEY TO BUY MORE.: NEVER TRUE

## 2023-02-28 SDOH — ECONOMIC STABILITY: FOOD INSECURITY: WITHIN THE PAST 12 MONTHS, THE FOOD YOU BOUGHT JUST DIDN'T LAST AND YOU DIDN'T HAVE MONEY TO GET MORE.: NEVER TRUE

## 2023-02-28 SDOH — ECONOMIC STABILITY: INCOME INSECURITY: HOW HARD IS IT FOR YOU TO PAY FOR THE VERY BASICS LIKE FOOD, HOUSING, MEDICAL CARE, AND HEATING?: NOT HARD AT ALL

## 2023-02-28 SDOH — ECONOMIC STABILITY: HOUSING INSECURITY
IN THE LAST 12 MONTHS, WAS THERE A TIME WHEN YOU DID NOT HAVE A STEADY PLACE TO SLEEP OR SLEPT IN A SHELTER (INCLUDING NOW)?: NO

## 2023-03-03 ENCOUNTER — OFFICE VISIT (OUTPATIENT)
Dept: FAMILY MEDICINE CLINIC | Age: 63
End: 2023-03-03
Payer: COMMERCIAL

## 2023-03-03 VITALS
DIASTOLIC BLOOD PRESSURE: 74 MMHG | SYSTOLIC BLOOD PRESSURE: 106 MMHG | HEART RATE: 62 BPM | HEIGHT: 72 IN | OXYGEN SATURATION: 98 % | WEIGHT: 186 LBS | BODY MASS INDEX: 25.19 KG/M2

## 2023-03-03 DIAGNOSIS — Z12.5 PROSTATE CANCER SCREENING: ICD-10-CM

## 2023-03-03 DIAGNOSIS — E78.2 MIXED HYPERLIPIDEMIA: Primary | ICD-10-CM

## 2023-03-03 LAB
A/G RATIO: 1.9 (ref 1.1–2.2)
ALBUMIN SERPL-MCNC: 4.3 G/DL (ref 3.4–5)
ALP BLD-CCNC: 91 U/L (ref 40–129)
ALT SERPL-CCNC: 25 U/L (ref 10–40)
ANION GAP SERPL CALCULATED.3IONS-SCNC: 11 MMOL/L (ref 3–16)
AST SERPL-CCNC: 22 U/L (ref 15–37)
BILIRUB SERPL-MCNC: 0.8 MG/DL (ref 0–1)
BUN BLDV-MCNC: 17 MG/DL (ref 7–20)
CALCIUM SERPL-MCNC: 9.2 MG/DL (ref 8.3–10.6)
CHLORIDE BLD-SCNC: 107 MMOL/L (ref 99–110)
CHOLESTEROL, TOTAL: 125 MG/DL (ref 0–199)
CO2: 23 MMOL/L (ref 21–32)
CREAT SERPL-MCNC: 0.8 MG/DL (ref 0.8–1.3)
GFR SERPL CREATININE-BSD FRML MDRD: >60 ML/MIN/{1.73_M2}
GLUCOSE BLD-MCNC: 105 MG/DL (ref 70–99)
HDLC SERPL-MCNC: 41 MG/DL (ref 40–60)
LDL CHOLESTEROL CALCULATED: 71 MG/DL
POTASSIUM SERPL-SCNC: 4.2 MMOL/L (ref 3.5–5.1)
PROSTATE SPECIFIC ANTIGEN: 0.5 NG/ML (ref 0–4)
SODIUM BLD-SCNC: 141 MMOL/L (ref 136–145)
TOTAL PROTEIN: 6.6 G/DL (ref 6.4–8.2)
TRIGL SERPL-MCNC: 66 MG/DL (ref 0–150)
VLDLC SERPL CALC-MCNC: 13 MG/DL

## 2023-03-03 PROCEDURE — 99213 OFFICE O/P EST LOW 20 MIN: CPT | Performed by: PHYSICIAN ASSISTANT

## 2023-03-03 RX ORDER — ATORVASTATIN CALCIUM 80 MG/1
TABLET, FILM COATED ORAL
Qty: 90 TABLET | Refills: 1 | Status: SHIPPED | OUTPATIENT
Start: 2023-03-03

## 2023-03-03 SDOH — ECONOMIC STABILITY: INCOME INSECURITY: HOW HARD IS IT FOR YOU TO PAY FOR THE VERY BASICS LIKE FOOD, HOUSING, MEDICAL CARE, AND HEATING?: NOT HARD AT ALL

## 2023-03-03 SDOH — ECONOMIC STABILITY: FOOD INSECURITY: WITHIN THE PAST 12 MONTHS, YOU WORRIED THAT YOUR FOOD WOULD RUN OUT BEFORE YOU GOT MONEY TO BUY MORE.: NEVER TRUE

## 2023-03-03 SDOH — ECONOMIC STABILITY: FOOD INSECURITY: WITHIN THE PAST 12 MONTHS, THE FOOD YOU BOUGHT JUST DIDN'T LAST AND YOU DIDN'T HAVE MONEY TO GET MORE.: NEVER TRUE

## 2023-03-03 ASSESSMENT — PATIENT HEALTH QUESTIONNAIRE - PHQ9
SUM OF ALL RESPONSES TO PHQ QUESTIONS 1-9: 0
1. LITTLE INTEREST OR PLEASURE IN DOING THINGS: 0
SUM OF ALL RESPONSES TO PHQ QUESTIONS 1-9: 0
SUM OF ALL RESPONSES TO PHQ9 QUESTIONS 1 & 2: 0
SUM OF ALL RESPONSES TO PHQ QUESTIONS 1-9: 0
2. FEELING DOWN, DEPRESSED OR HOPELESS: 0
SUM OF ALL RESPONSES TO PHQ QUESTIONS 1-9: 0

## 2023-03-03 NOTE — PROGRESS NOTES
Subjective:   Jojo Meaz is a 57 y.o. male with hyperlipidemia. Current Outpatient Medications   Medication Sig Dispense Refill    atorvastatin (LIPITOR) 80 MG tablet TAKE 1 TABLET BY MOUTH EVERY DAY 90 tablet 1    aspirin 81 MG EC tablet TAKE 1 TABLET (81 MG TOTAL) BY MOUTH DAILY WITH BREAKFAST.  3     No current facility-administered medications for this visit. Cardiovascular risk analysis - 57 y.o. male LDL goal is under 100. ROS: taking medications as instructed, no medication side effects noted, no TIA's, no chest pain on exertion, no dyspnea on exertion, no swelling of ankles. New concerns: none, overall feeling well. Objective:   /74 (Site: Right Upper Arm, Position: Sitting)   Pulse 62   Ht 6' (1.829 m)   Wt 186 lb (84.4 kg)   SpO2 98%   BMI 25.23 kg/m²    Appearance alert, well appearing, and in no distress, oriented to person, place, and time, and normal appearing weight. General exam BP noted to be well controlled today in office, S1, S2 normal, no gallop, no murmur, chest clear, no JVD, no HSM, no edema. Assessment/Plan:     Diagnosis Orders   1. Mixed hyperlipidemia  Lipid Panel- continue lipitor 80mg daily    Comprehensive Metabolic Panel      2.  Prostate cancer screening  PSA Screening

## 2023-05-17 ENCOUNTER — PROCEDURE VISIT (OUTPATIENT)
Dept: AUDIOLOGY | Age: 63
End: 2023-05-17

## 2023-05-17 ENCOUNTER — TELEPHONE (OUTPATIENT)
Dept: FAMILY MEDICINE CLINIC | Age: 63
End: 2023-05-17

## 2023-05-17 ENCOUNTER — PROCEDURE VISIT (OUTPATIENT)
Dept: AUDIOLOGY | Age: 63
End: 2023-05-17
Payer: COMMERCIAL

## 2023-05-17 DIAGNOSIS — H90.6 MIXED CONDUCTIVE AND SENSORINEURAL HEARING LOSS, BILATERAL: Primary | ICD-10-CM

## 2023-05-17 DIAGNOSIS — H90.3 SENSORINEURAL HEARING LOSS, BILATERAL: Primary | ICD-10-CM

## 2023-05-17 PROCEDURE — 92557 COMPREHENSIVE HEARING TEST: CPT | Performed by: AUDIOLOGIST

## 2023-05-17 NOTE — TELEPHONE ENCOUNTER
[facility] called requesting a referral be placed for patient and needs to say referral for Audiology. They have gone ahead and scheduled him but need the referral, the last one was from  in 2020 so this will not work.   New referral has been placed using the same diagnosis codes from  referral.

## 2023-05-17 NOTE — PROGRESS NOTES
[facility]   1960, 57 y.o. male   [id number]       Referring Provider: KACEY Noriega   Referral Type: In an order in [address] [place] [address]    Reason for Visit: Evaluation of suspected change in hearing    ADULT AUDIOLOGIC EVALUATION      [facility] is a 57 y.o. male seen today, 5/17/2023 , for an audiologic evaluation. Previous evaluation from 10/9/2020 viewable in medical record. AUDIOLOGIC AND OTHER PERTINENT MEDICAL HISTORY:      [facility] noted possible change in hearing, bilaterally. Per previous evaluations he notes known asymmetric hearing loss (LE>RE), and family history of hearing loss. No additional significant otologic or medical history was reported. [facility] denied otalgia, aural fullness, otorrhea, tinnitus, dizziness, imbalance, history of falls, history of occupational/recreational noise exposure, history of head trauma and history of ear surgery. Date: 5/17/2023     IMPRESSIONS:      Today's results revealed a symmetric sensorineural hearing loss with good to excellent word recognition, bilaterally. Hearing stable compared to previous audiogram.     ASSESSMENT AND FINDINGS:     Otoscopy revealed: Clear ear canals bilaterally    RIGHT EAR:  Hearing Sensitivity: Within normal limits through 2kHz precipitously sloping to a moderate to moderately-severe sensorineural hearing loss. Speech Recognition Threshold: 10 dB HL  Word Recognition: Excellent 96%, based on NU-6 25-word list at 55 dBHL using recorded speech stimuli. LEFT EAR:  Hearing Sensitivity: Within normal limits through 5000Hz sloping to a mild to moderate precipitously sloping to severe sensorineural hearing loss  Speech Recognition Threshold: 20 dB HL  Word Recognition: Good 88%, based on NU-6 25-word list at 85 dBHL masked using recorded speech stimuli. Reliability: Good   Transducer: Headphones (checked with inserts)     See scanned audiogram dated 5/17/2023  for results.         PATIENT

## 2023-05-17 NOTE — PROGRESS NOTES
Elizabeth Stevens  [birth year]53 y.o. male   [id number]    HEARING AID CHECK    Elizabeth Stevens was seen today, 5/17/2023, for a hearing aid check appointment. PROCEDURES:     Otoscopy revealed: Clear ear canals bilaterally    Patient noted overall benefit with devices; however left  is bent an intermittent. Hearing aids were cleaned and checked. Changed left 2(M) . Microphone and  ports were suctioned, domes and wax traps were changed, and devices completed a desiccant cycle through [employer]. Post-cleaning listening check revealed good function of the devices. Connected devices to fitting software. Datalogging revealed 15 hours of use per day. PATIENT EDUCATION:     - Verbally and visually reviewed importance of consistent use and care and maintenance of devices. Information was verbally shared with patient during appointment. RECOMMENDATIONS:     Continue consistent hearing aid use  Return for hearing aid checks as needed  Retest hearing as medically indicated and/or sooner if a change in hearing is noted.   Contact audiologist with questions/concerns as needed      **No charge for today's appointment      [facility]wood LLCCathy  Audiologist

## 2023-08-29 RX ORDER — ATORVASTATIN CALCIUM 80 MG/1
TABLET, FILM COATED ORAL
Qty: 90 TABLET | Refills: 1 | Status: SHIPPED | OUTPATIENT
Start: 2023-08-29

## 2023-08-29 NOTE — TELEPHONE ENCOUNTER
Dean Gross [phone] (home)    is requesting refill(s) of medication Atorvastatin 80 mg tablet to preferred pharmacy [facility]    Last OV 03/03/23 (pertaining to medication)   Last refill 03/03/23 (per medication requested)  Next office visit scheduled or attempted Yes  Date 03/11/24

## 2023-11-14 ENCOUNTER — TELEPHONE (OUTPATIENT)
Dept: ENT CLINIC | Age: 63
End: 2023-11-14

## 2023-11-14 NOTE — TELEPHONE ENCOUNTER
Voicemail left 2x asking patient to return call.   Appointment scheduled for 11/15 @8am needs rescheduled

## 2023-11-21 ENCOUNTER — PROCEDURE VISIT (OUTPATIENT)
Dept: AUDIOLOGY | Age: 63
End: 2023-11-21
Payer: COMMERCIAL

## 2023-11-21 DIAGNOSIS — H90.3 SENSORINEURAL HEARING LOSS, BILATERAL: Primary | ICD-10-CM

## 2023-11-21 PROCEDURE — 92593 PR HEARING AID CHECK BINAURAL: CPT | Performed by: AUDIOLOGIST

## 2023-11-21 NOTE — PROGRESS NOTES
Tyson Germain  [birth year]34 y.o. male   [id number]    RIGHT EAR: /MODEL: Sveta Florence P70-R  SN: [id number]  EARMOLD/TUBING/WIRE/DOME: 2(M) with large open domes    LEFT EAR: /MODELCheryle Hippo  SN: [id number]  EARMOLD/TUBING/WIRE/DOME: 2(M) with large vented domes    HAF: 11/27/2020  30 DAY RIGHT-TO-RETURN: 12/27/2020  WARRANTY: 2/17/2023    HEARING AID CHECK    Luis Eduardo Cosme was seen today, 11/21/2023, for a hearing aid check appointment    PROCEDURES:     Otoscopy revealed: Clear ear canals bilaterally    Patient noted overall satisfaction with devices-notes  has been intermittent (charging cord not plugging in). Hearing aids were cleaned and checked. A listening check revealed good function of the devices. Microphone and  ports were suctioned, domes and wax traps were changed, and devices completed a desiccant cycle through [employer]. Post-cleaning listening check revealed good function of the devices. Switched out charging cord. Devices charging properly in office. Connected devices to fitting software. Updated devices. PATIENT EDUCATION:     - Verbally and visually reviewed importance of consistent use and care and maintenance of devices. Information was verbally shared with patient during appointment. RECOMMENDATIONS:     Continue consistent hearing aid use  Return for hearing aid checks as needed  Retest hearing as medically indicated and/or sooner if a change in hearing is noted. Contact audiologist with questions/concerns as needed      **Patient paid $60.00 hearing aid check fee for today's appointment.     Cathy Travis  Audiologist

## 2024-02-20 ENCOUNTER — APPOINTMENT (OUTPATIENT)
Dept: GENERAL RADIOLOGY | Age: 64
DRG: 287 | End: 2024-02-20
Payer: MEDICARE

## 2024-02-20 ENCOUNTER — HOSPITAL ENCOUNTER (INPATIENT)
Age: 64
LOS: 1 days | Discharge: HOME | DRG: 287 | End: 2024-02-21
Attending: STUDENT IN AN ORGANIZED HEALTH CARE EDUCATION/TRAINING PROGRAM | Admitting: STUDENT IN AN ORGANIZED HEALTH CARE EDUCATION/TRAINING PROGRAM
Payer: MEDICARE

## 2024-02-20 DIAGNOSIS — R07.9 CHEST PAIN, UNSPECIFIED TYPE: Primary | ICD-10-CM

## 2024-02-20 LAB
ALBUMIN SERPL-MCNC: 4.7 G/DL (ref 3.4–5)
ALBUMIN/GLOB SERPL: 2 {RATIO} (ref 1.1–2.2)
ALP SERPL-CCNC: 101 U/L (ref 40–129)
ALT SERPL-CCNC: 27 U/L (ref 10–40)
ANION GAP SERPL CALCULATED.3IONS-SCNC: 8 MMOL/L (ref 3–16)
AST SERPL-CCNC: 21 U/L (ref 15–37)
BASOPHILS # BLD: 0.1 K/UL (ref 0–0.2)
BASOPHILS NFR BLD: 0.9 %
BILIRUB SERPL-MCNC: 0.4 MG/DL (ref 0–1)
BUN SERPL-MCNC: 16 MG/DL (ref 7–20)
CALCIUM SERPL-MCNC: 9.5 MG/DL (ref 8.3–10.6)
CHLORIDE SERPL-SCNC: 103 MMOL/L (ref 99–110)
CO2 SERPL-SCNC: 28 MMOL/L (ref 21–32)
CREAT SERPL-MCNC: 0.7 MG/DL (ref 0.8–1.3)
D DIMER: 0.29 UG/ML FEU (ref 0–0.6)
DEPRECATED RDW RBC AUTO: 13.3 % (ref 12.4–15.4)
EKG ATRIAL RATE: 82 BPM
EKG DIAGNOSIS: NORMAL
EKG P AXIS: 43 DEGREES
EKG P-R INTERVAL: 152 MS
EKG Q-T INTERVAL: 354 MS
EKG QRS DURATION: 78 MS
EKG QTC CALCULATION (BAZETT): 413 MS
EKG R AXIS: 54 DEGREES
EKG T AXIS: 48 DEGREES
EKG VENTRICULAR RATE: 82 BPM
EOSINOPHIL # BLD: 0.1 K/UL (ref 0–0.6)
EOSINOPHIL NFR BLD: 1.3 %
GFR SERPLBLD CREATININE-BSD FMLA CKD-EPI: >60 ML/MIN/{1.73_M2}
GLUCOSE SERPL-MCNC: 104 MG/DL (ref 70–99)
HCT VFR BLD AUTO: 45.3 % (ref 40.5–52.5)
HGB BLD-MCNC: 15.6 G/DL (ref 13.5–17.5)
LIPASE SERPL-CCNC: 40 U/L (ref 13–60)
LYMPHOCYTES # BLD: 2.2 K/UL (ref 1–5.1)
LYMPHOCYTES NFR BLD: 22.5 %
MCH RBC QN AUTO: 31.3 PG (ref 26–34)
MCHC RBC AUTO-ENTMCNC: 34.4 G/DL (ref 31–36)
MCV RBC AUTO: 90.8 FL (ref 80–100)
MONOCYTES # BLD: 0.6 K/UL (ref 0–1.3)
MONOCYTES NFR BLD: 6.2 %
NEUTROPHILS # BLD: 6.9 K/UL (ref 1.7–7.7)
NEUTROPHILS NFR BLD: 69.1 %
PLATELET # BLD AUTO: 283 K/UL (ref 135–450)
PMV BLD AUTO: 7.3 FL (ref 5–10.5)
POTASSIUM SERPL-SCNC: 3.8 MMOL/L (ref 3.5–5.1)
PROT SERPL-MCNC: 7 G/DL (ref 6.4–8.2)
RBC # BLD AUTO: 4.99 M/UL (ref 4.2–5.9)
SODIUM SERPL-SCNC: 139 MMOL/L (ref 136–145)
TROPONIN, HIGH SENSITIVITY: 10 NG/L (ref 0–22)
TROPONIN, HIGH SENSITIVITY: 10 NG/L (ref 0–22)
WBC # BLD AUTO: 9.9 K/UL (ref 4–11)

## 2024-02-20 PROCEDURE — 71045 X-RAY EXAM CHEST 1 VIEW: CPT

## 2024-02-20 PROCEDURE — 2580000003 HC RX 258: Performed by: STUDENT IN AN ORGANIZED HEALTH CARE EDUCATION/TRAINING PROGRAM

## 2024-02-20 PROCEDURE — 36415 COLL VENOUS BLD VENIPUNCTURE: CPT

## 2024-02-20 PROCEDURE — 6360000002 HC RX W HCPCS: Performed by: PHYSICIAN ASSISTANT

## 2024-02-20 PROCEDURE — G0378 HOSPITAL OBSERVATION PER HR: HCPCS

## 2024-02-20 PROCEDURE — 6370000000 HC RX 637 (ALT 250 FOR IP): Performed by: PHYSICIAN ASSISTANT

## 2024-02-20 PROCEDURE — 6360000002 HC RX W HCPCS: Performed by: STUDENT IN AN ORGANIZED HEALTH CARE EDUCATION/TRAINING PROGRAM

## 2024-02-20 PROCEDURE — 84484 ASSAY OF TROPONIN QUANT: CPT

## 2024-02-20 PROCEDURE — 80053 COMPREHEN METABOLIC PANEL: CPT

## 2024-02-20 PROCEDURE — 93010 ELECTROCARDIOGRAM REPORT: CPT | Performed by: INTERNAL MEDICINE

## 2024-02-20 PROCEDURE — 85025 COMPLETE CBC W/AUTO DIFF WBC: CPT

## 2024-02-20 PROCEDURE — 6370000000 HC RX 637 (ALT 250 FOR IP): Performed by: STUDENT IN AN ORGANIZED HEALTH CARE EDUCATION/TRAINING PROGRAM

## 2024-02-20 PROCEDURE — 85379 FIBRIN DEGRADATION QUANT: CPT

## 2024-02-20 PROCEDURE — 93005 ELECTROCARDIOGRAM TRACING: CPT | Performed by: STUDENT IN AN ORGANIZED HEALTH CARE EDUCATION/TRAINING PROGRAM

## 2024-02-20 PROCEDURE — 83690 ASSAY OF LIPASE: CPT

## 2024-02-20 PROCEDURE — 99285 EMERGENCY DEPT VISIT HI MDM: CPT

## 2024-02-20 RX ORDER — SODIUM CHLORIDE 0.9 % (FLUSH) 0.9 %
5-40 SYRINGE (ML) INJECTION EVERY 12 HOURS SCHEDULED
Status: DISCONTINUED | OUTPATIENT
Start: 2024-02-20 | End: 2024-02-21 | Stop reason: HOSPADM

## 2024-02-20 RX ORDER — ASPIRIN 81 MG/1
162 TABLET, CHEWABLE ORAL ONCE
Status: COMPLETED | OUTPATIENT
Start: 2024-02-20 | End: 2024-02-20

## 2024-02-20 RX ORDER — ONDANSETRON 4 MG/1
4 TABLET, ORALLY DISINTEGRATING ORAL EVERY 8 HOURS PRN
Status: DISCONTINUED | OUTPATIENT
Start: 2024-02-20 | End: 2024-02-21 | Stop reason: HOSPADM

## 2024-02-20 RX ORDER — KETOROLAC TROMETHAMINE 30 MG/ML
15 INJECTION, SOLUTION INTRAMUSCULAR; INTRAVENOUS ONCE
Status: COMPLETED | OUTPATIENT
Start: 2024-02-20 | End: 2024-02-20

## 2024-02-20 RX ORDER — ONDANSETRON 2 MG/ML
4 INJECTION INTRAMUSCULAR; INTRAVENOUS EVERY 6 HOURS PRN
Status: DISCONTINUED | OUTPATIENT
Start: 2024-02-20 | End: 2024-02-21

## 2024-02-20 RX ORDER — ENOXAPARIN SODIUM 100 MG/ML
40 INJECTION SUBCUTANEOUS DAILY
Status: DISCONTINUED | OUTPATIENT
Start: 2024-02-20 | End: 2024-02-21 | Stop reason: HOSPADM

## 2024-02-20 RX ORDER — POLYETHYLENE GLYCOL 3350 17 G/17G
17 POWDER, FOR SOLUTION ORAL DAILY PRN
Status: DISCONTINUED | OUTPATIENT
Start: 2024-02-20 | End: 2024-02-21 | Stop reason: HOSPADM

## 2024-02-20 RX ORDER — SODIUM CHLORIDE 9 MG/ML
INJECTION, SOLUTION INTRAVENOUS PRN
Status: DISCONTINUED | OUTPATIENT
Start: 2024-02-20 | End: 2024-02-21 | Stop reason: HOSPADM

## 2024-02-20 RX ORDER — MAGNESIUM SULFATE IN WATER 40 MG/ML
2000 INJECTION, SOLUTION INTRAVENOUS PRN
Status: DISCONTINUED | OUTPATIENT
Start: 2024-02-20 | End: 2024-02-21 | Stop reason: HOSPADM

## 2024-02-20 RX ORDER — SODIUM CHLORIDE 0.9 % (FLUSH) 0.9 %
5-40 SYRINGE (ML) INJECTION PRN
Status: DISCONTINUED | OUTPATIENT
Start: 2024-02-20 | End: 2024-02-21 | Stop reason: HOSPADM

## 2024-02-20 RX ORDER — POTASSIUM CHLORIDE 20 MEQ/1
40 TABLET, EXTENDED RELEASE ORAL PRN
Status: DISCONTINUED | OUTPATIENT
Start: 2024-02-20 | End: 2024-02-21 | Stop reason: HOSPADM

## 2024-02-20 RX ORDER — ATORVASTATIN CALCIUM 80 MG/1
80 TABLET, FILM COATED ORAL DAILY
Status: DISCONTINUED | OUTPATIENT
Start: 2024-02-20 | End: 2024-02-21 | Stop reason: HOSPADM

## 2024-02-20 RX ORDER — POTASSIUM CHLORIDE 7.45 MG/ML
10 INJECTION INTRAVENOUS PRN
Status: DISCONTINUED | OUTPATIENT
Start: 2024-02-20 | End: 2024-02-21 | Stop reason: HOSPADM

## 2024-02-20 RX ORDER — ASPIRIN 81 MG/1
81 TABLET ORAL DAILY
Status: DISCONTINUED | OUTPATIENT
Start: 2024-02-20 | End: 2024-02-21 | Stop reason: HOSPADM

## 2024-02-20 RX ADMIN — ATORVASTATIN CALCIUM 80 MG: 80 TABLET, FILM COATED ORAL at 21:34

## 2024-02-20 RX ADMIN — Medication 10 ML: at 21:34

## 2024-02-20 RX ADMIN — ENOXAPARIN SODIUM 40 MG: 100 INJECTION SUBCUTANEOUS at 21:39

## 2024-02-20 RX ADMIN — ASPIRIN 81 MG 162 MG: 81 TABLET ORAL at 15:46

## 2024-02-20 RX ADMIN — KETOROLAC TROMETHAMINE 15 MG: 30 INJECTION INTRAMUSCULAR; INTRAVENOUS at 17:08

## 2024-02-20 ASSESSMENT — PAIN SCALES - GENERAL
PAINLEVEL_OUTOF10: 6
PAINLEVEL_OUTOF10: 6
PAINLEVEL_OUTOF10: 3

## 2024-02-20 ASSESSMENT — PAIN - FUNCTIONAL ASSESSMENT: PAIN_FUNCTIONAL_ASSESSMENT: 0-10

## 2024-02-20 ASSESSMENT — HEART SCORE: ECG: 0

## 2024-02-20 NOTE — ED PROVIDER NOTES
[facility]  ED  EMERGENCY DEPARTMENT ENCOUNTER        Pt Name: Tejinder Cosme  MRN: [id number]  Birthdate 1960  Date of evaluation: 2/20/2024  Provider: GABY OROURKE PA-C  PCP: Angela Stephens PA  ED Attending: MD Carlos       I have seen and evaluated this patient with my supervising physician Lisa Gonzalez MD.    CHIEF COMPLAINT:     Chief Complaint   Patient presents with    Chest Pain     Pt to ED for chest pain, pt states he was opening boxes at work when the pain started. Pt states last week his left shoulder was hurting, pt also c/o abdominal pain. Pt took 1 ASA PTA.        HISTORY OF PRESENT ILLNESS:      History provided by the patient. No limitations.    Tejinder Cosme is a 63 y.o. male who arrives to the ED by private vehicle.  Patient is here with chest pain.  He states while at work about a week ago he noticed pain to the left upper back/shoulder blade.  He has had the pain off and on.  Today he noticed having some associated left anterior chest discomfort.  This occurred while he was working at a [employer].  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  He was not sure if it was pain caused from the activity or due to the exertion of the activity.  He has no known CAD.  Reports having had a stress test about 20 years ago.  In 2019 had a stroke attributed to a PFO that was repaired at the time.  He is on meds for hyperlipidemia but denies hypertension, diabetes, he is a non-smoker and denies significant family history of CAD.    Nursing Notes were reviewed     REVIEW OF SYSTEMS:     Review of Systems  Positives and pertinent negatives as per HPI.      PAST MEDICAL HISTORY:     Past Medical History:   Diagnosis Date    Cerebral artery occlusion with cerebral infarction (HCC)     TIA    Cerebrovascular accident (CVA) due to embolism of right middle cerebral artery (HCC) 6/25/2020    Chronic pain of left thumb

## 2024-02-20 NOTE — ED PROVIDER NOTES
Emergency Department Attending Provider Note  Location: [facility]  ED  2/20/2024     Patient Identification  Tejinder Cosme is a 63 y.o. male      Tejinder Cosme was evaluated in the Emergency Department for chest pain. Although initial history and physical exam information was obtained by Jesu ERAZO (who also dictated a record of this visit), I personally saw the patient and performed a substantive portion of the visit including all aspects of the medical decision making.    Patient seen and evaluated.  Relevant records reviewed.  Patient is a 63-year-old male who presents with chest pain.  Chest pain started today.  States that it radiates to his left upper back and shoulder blade.  Patient does have chest pain at rest.  He has not had a recent stress test or echo.  Patient presented hypertensive but vitals otherwise normal.  On exam he had a regular rate and rhythm clear breath sounds bilaterally.  No focal deficits.    Given history and exam my differential diagnosis includes but is not limited to ACS, cardiomyopathy, COPD, pneumonia, musculoskeletal pain, esophageal spasm, hepatobiliary disease, pancreatitis.  He has no ripping or tearing chest pain concerning for dissection.  Furthermore he has equal bilateral peripheral pulses and is neuro intact.    Chronic medical conditions include history of PFO, stroke, hyperlipidemia.  No significant social determinants    Diagnostic studies reviewed and interpreted  EKG with normal sinus rhythm, ventricular rate of 82, normal QRS, normal QTc, no STEMI.  No ischemic change  CBC with a normal white blood cell count, no evidence of anemia, normal platelets  CMP with normal electrolytes, normal renal function, mildly hyperglycemic to 104, normal LFTs and bilirubin  Normal troponin at 10 with repeat stable at 10  Normal D-dimer at 0.29    Patient has heart score 4.  Given no recent stress test or cardiac workup we admitted for patient for further

## 2024-02-21 ENCOUNTER — APPOINTMENT (OUTPATIENT)
Dept: CARDIAC CATH/INVASIVE PROCEDURES | Age: 64
DRG: 287 | End: 2024-02-21
Payer: MEDICARE

## 2024-02-21 ENCOUNTER — APPOINTMENT (OUTPATIENT)
Dept: NUCLEAR MEDICINE | Age: 64
DRG: 287 | End: 2024-02-21
Payer: MEDICARE

## 2024-02-21 ENCOUNTER — APPOINTMENT (OUTPATIENT)
Dept: CT IMAGING | Age: 64
DRG: 287 | End: 2024-02-21
Payer: MEDICARE

## 2024-02-21 VITALS
SYSTOLIC BLOOD PRESSURE: 101 MMHG | HEIGHT: 72 IN | DIASTOLIC BLOOD PRESSURE: 68 MMHG | OXYGEN SATURATION: 96 % | RESPIRATION RATE: 18 BRPM | BODY MASS INDEX: 23.7 KG/M2 | TEMPERATURE: 97.3 F | WEIGHT: 175 LBS | HEART RATE: 83 BPM

## 2024-02-21 PROBLEM — Q21.12 PFO (PATENT FORAMEN OVALE): Status: ACTIVE | Noted: 2024-02-21

## 2024-02-21 LAB
ALBUMIN SERPL-MCNC: 3.9 G/DL (ref 3.4–5)
ALBUMIN/GLOB SERPL: 1.9 {RATIO} (ref 1.1–2.2)
ALP SERPL-CCNC: 86 U/L (ref 40–129)
ALT SERPL-CCNC: 21 U/L (ref 10–40)
ANION GAP SERPL CALCULATED.3IONS-SCNC: 9 MMOL/L (ref 3–16)
AST SERPL-CCNC: 20 U/L (ref 15–37)
BASOPHILS # BLD: 0 K/UL (ref 0–0.2)
BASOPHILS NFR BLD: 0.5 %
BILIRUB SERPL-MCNC: 0.5 MG/DL (ref 0–1)
BUN SERPL-MCNC: 15 MG/DL (ref 7–20)
CALCIUM SERPL-MCNC: 9 MG/DL (ref 8.3–10.6)
CHLORIDE SERPL-SCNC: 106 MMOL/L (ref 99–110)
CO2 SERPL-SCNC: 25 MMOL/L (ref 21–32)
CREAT SERPL-MCNC: 0.6 MG/DL (ref 0.8–1.3)
DEPRECATED RDW RBC AUTO: 13 % (ref 12.4–15.4)
EOSINOPHIL # BLD: 0.2 K/UL (ref 0–0.6)
EOSINOPHIL NFR BLD: 2.4 %
GFR SERPLBLD CREATININE-BSD FMLA CKD-EPI: >60 ML/MIN/{1.73_M2}
GLUCOSE SERPL-MCNC: 100 MG/DL (ref 70–99)
HCT VFR BLD AUTO: 43.3 % (ref 40.5–52.5)
HGB BLD-MCNC: 14.7 G/DL (ref 13.5–17.5)
LYMPHOCYTES # BLD: 2.7 K/UL (ref 1–5.1)
LYMPHOCYTES NFR BLD: 31.8 %
MCH RBC QN AUTO: 30.6 PG (ref 26–34)
MCHC RBC AUTO-ENTMCNC: 33.8 G/DL (ref 31–36)
MCV RBC AUTO: 90.6 FL (ref 80–100)
MONOCYTES # BLD: 0.8 K/UL (ref 0–1.3)
MONOCYTES NFR BLD: 9.3 %
NEUTROPHILS # BLD: 4.7 K/UL (ref 1.7–7.7)
NEUTROPHILS NFR BLD: 56 %
PLATELET # BLD AUTO: 256 K/UL (ref 135–450)
PMV BLD AUTO: 7.9 FL (ref 5–10.5)
POTASSIUM SERPL-SCNC: 3.7 MMOL/L (ref 3.5–5.1)
PROT SERPL-MCNC: 6 G/DL (ref 6.4–8.2)
RBC # BLD AUTO: 4.79 M/UL (ref 4.2–5.9)
SODIUM SERPL-SCNC: 140 MMOL/L (ref 136–145)
WBC # BLD AUTO: 8.4 K/UL (ref 4–11)

## 2024-02-21 PROCEDURE — 6360000002 HC RX W HCPCS

## 2024-02-21 PROCEDURE — 3430000000 HC RX DIAGNOSTIC RADIOPHARMACEUTICAL: Performed by: INTERNAL MEDICINE

## 2024-02-21 PROCEDURE — C1894 INTRO/SHEATH, NON-LASER: HCPCS | Performed by: INTERNAL MEDICINE

## 2024-02-21 PROCEDURE — 78452 HT MUSCLE IMAGE SPECT MULT: CPT

## 2024-02-21 PROCEDURE — C1769 GUIDE WIRE: HCPCS | Performed by: INTERNAL MEDICINE

## 2024-02-21 PROCEDURE — 93356 MYOCRD STRAIN IMG SPCKL TRCK: CPT

## 2024-02-21 PROCEDURE — B2151ZZ FLUOROSCOPY OF LEFT HEART USING LOW OSMOLAR CONTRAST: ICD-10-PCS | Performed by: INTERNAL MEDICINE

## 2024-02-21 PROCEDURE — 2709999900 HC NON-CHARGEABLE SUPPLY: Performed by: INTERNAL MEDICINE

## 2024-02-21 PROCEDURE — 6360000004 HC RX CONTRAST MEDICATION

## 2024-02-21 PROCEDURE — A9502 TC99M TETROFOSMIN: HCPCS | Performed by: INTERNAL MEDICINE

## 2024-02-21 PROCEDURE — B2111ZZ FLUOROSCOPY OF MULTIPLE CORONARY ARTERIES USING LOW OSMOLAR CONTRAST: ICD-10-PCS | Performed by: INTERNAL MEDICINE

## 2024-02-21 PROCEDURE — 93458 L HRT ARTERY/VENTRICLE ANGIO: CPT

## 2024-02-21 PROCEDURE — 2060000000 HC ICU INTERMEDIATE R&B

## 2024-02-21 PROCEDURE — 2580000003 HC RX 258: Performed by: STUDENT IN AN ORGANIZED HEALTH CARE EDUCATION/TRAINING PROGRAM

## 2024-02-21 PROCEDURE — 80053 COMPREHEN METABOLIC PANEL: CPT

## 2024-02-21 PROCEDURE — 4A023N7 MEASUREMENT OF CARDIAC SAMPLING AND PRESSURE, LEFT HEART, PERCUTANEOUS APPROACH: ICD-10-PCS | Performed by: INTERNAL MEDICINE

## 2024-02-21 PROCEDURE — 93017 CV STRESS TEST TRACING ONLY: CPT

## 2024-02-21 PROCEDURE — 6370000000 HC RX 637 (ALT 250 FOR IP): Performed by: STUDENT IN AN ORGANIZED HEALTH CARE EDUCATION/TRAINING PROGRAM

## 2024-02-21 PROCEDURE — 75571 CT HRT W/O DYE W/CA TEST: CPT

## 2024-02-21 PROCEDURE — 85347 COAGULATION TIME ACTIVATED: CPT

## 2024-02-21 PROCEDURE — 2580000003 HC RX 258

## 2024-02-21 PROCEDURE — 85025 COMPLETE CBC W/AUTO DIFF WBC: CPT

## 2024-02-21 PROCEDURE — 2500000003 HC RX 250 WO HCPCS

## 2024-02-21 PROCEDURE — 93303 ECHO TRANSTHORACIC: CPT

## 2024-02-21 PROCEDURE — 36415 COLL VENOUS BLD VENIPUNCTURE: CPT

## 2024-02-21 RX ORDER — FENTANYL CITRATE 50 UG/ML
INJECTION, SOLUTION INTRAMUSCULAR; INTRAVENOUS
Status: COMPLETED | OUTPATIENT
Start: 2024-02-21 | End: 2024-02-21

## 2024-02-21 RX ORDER — ASPIRIN 81 MG/1
243 TABLET, CHEWABLE ORAL ONCE
Status: DISCONTINUED | OUTPATIENT
Start: 2024-02-21 | End: 2024-02-21 | Stop reason: HOSPADM

## 2024-02-21 RX ORDER — SODIUM CHLORIDE 0.9 % (FLUSH) 0.9 %
5-40 SYRINGE (ML) INJECTION PRN
Status: DISCONTINUED | OUTPATIENT
Start: 2024-02-21 | End: 2024-02-21 | Stop reason: HOSPADM

## 2024-02-21 RX ORDER — SODIUM CHLORIDE 9 MG/ML
INJECTION, SOLUTION INTRAVENOUS PRN
Status: DISCONTINUED | OUTPATIENT
Start: 2024-02-21 | End: 2024-02-21 | Stop reason: HOSPADM

## 2024-02-21 RX ORDER — ONDANSETRON 2 MG/ML
4 INJECTION INTRAMUSCULAR; INTRAVENOUS EVERY 6 HOURS PRN
Status: DISCONTINUED | OUTPATIENT
Start: 2024-02-21 | End: 2024-02-21 | Stop reason: HOSPADM

## 2024-02-21 RX ORDER — LORAZEPAM 0.5 MG/1
0.5 TABLET ORAL
Status: DISCONTINUED | OUTPATIENT
Start: 2024-02-21 | End: 2024-02-21 | Stop reason: HOSPADM

## 2024-02-21 RX ORDER — MIDAZOLAM HYDROCHLORIDE 1 MG/ML
INJECTION INTRAMUSCULAR; INTRAVENOUS
Status: COMPLETED | OUTPATIENT
Start: 2024-02-21 | End: 2024-02-21

## 2024-02-21 RX ORDER — SODIUM CHLORIDE 0.9 % (FLUSH) 0.9 %
5-40 SYRINGE (ML) INJECTION EVERY 12 HOURS SCHEDULED
Status: DISCONTINUED | OUTPATIENT
Start: 2024-02-21 | End: 2024-02-21 | Stop reason: HOSPADM

## 2024-02-21 RX ADMIN — TETROFOSMIN 11 MILLICURIE: 1.38 INJECTION, POWDER, LYOPHILIZED, FOR SOLUTION INTRAVENOUS at 09:38

## 2024-02-21 RX ADMIN — FENTANYL CITRATE 50 MCG: 50 INJECTION, SOLUTION INTRAMUSCULAR; INTRAVENOUS at 14:22

## 2024-02-21 RX ADMIN — ASPIRIN 81 MG: 81 TABLET, COATED ORAL at 07:52

## 2024-02-21 RX ADMIN — Medication 10 ML: at 07:52

## 2024-02-21 RX ADMIN — MIDAZOLAM HYDROCHLORIDE 1 MG: 1 INJECTION INTRAMUSCULAR; INTRAVENOUS at 14:22

## 2024-02-21 RX ADMIN — TETROFOSMIN 33.6 MILLICURIE: 1.38 INJECTION, POWDER, LYOPHILIZED, FOR SOLUTION INTRAVENOUS at 10:59

## 2024-02-21 ASSESSMENT — PAIN SCALES - GENERAL: PAINLEVEL_OUTOF10: 0

## 2024-02-21 NOTE — CONSULTS
motion was normal;     there were no regional wall motion abnormalities. The study is     not technically sufficient to allow evaluation of LV diastolic     function.   - Right ventricle: Systolic function was normal by visual     assessment.   - Atrial septum: Agitated saline contrast study at baseline or with     provocation, shows no right-to-left atrial level shunt.     Stress Test:  none recently     Cath:    2019    Constantin Blakely MD     8/8/2019  9:56 PM   Structural Heart/ Interventional Cardiology Procedure Report:     PROCEDURE(S) PERFORMED:   1.  Intracardiac echocardiogram.   2.  Percutaneous closure of patent foramen ovale using 25 mm St.   Willy Amplatz   device.      BRIEF HISTORY/INDICATIONS:     58 y.o. male with a PMH of PFO with right-to-left shunt, CVA of   right MCA territory (1/2/2019), HLD, and pancreatitis. He was   admitted to [facility] 1/2/2019-1/4/2019 with left-sided weakness. CTA   of head/neck demonstrated abrupt non-enhancement of right M2   artery suggestive of occlusion/thrombus and MRI showed scattered   acute infarcts within right MCA territory. Subsequent TTE with   bubble study demonstrated tunneled PFO and positive bubble study   suggesting moderate-large PFO.      He was brought in for elective closure of patent foramen ovale.       NAME OF OPERATORS:     LIANE Newton MD      ESTIMATED BLOOD LOSS:  Around 20 cc.      COMPLICATIONS:  None.      DETAILS OF PROCEDURE(S):  Access of the right common femoral   vein.   Ultrasound guidance was used to access the right common femoral   vein.   Micropuncture kit was used to access the right common femoral   vein.  Two   sheaths were inserted, a 9-Senegalese 24 cm sheath and a 7-Senegalese   short sheath   were inserted in the right common femoral vein.  Intracardiac   echocardiogram   was done. This revealed patent foramen ovale with large amounts   of bubbles   crossing from right to left side.  The cardiac valves including   aortic

## 2024-02-21 NOTE — PLAN OF CARE
Problem: Discharge Planning  Goal: Discharge to home or other facility with appropriate resources  Outcome: Progressing  Flowsheets (Taken 2/20/2024 2115)  Discharge to home or other facility with appropriate resources: Identify barriers to discharge with patient and caregiver     Problem: Pain  Goal: Verbalizes/displays adequate comfort level or baseline comfort level  Outcome: Progressing

## 2024-02-21 NOTE — ED NOTES
Mr. Cosme is a 63 y.o. male who had concerns including Chest Pain (Pt to ED for chest pain, pt states he was opening boxes at work when the pain started. Pt states last week his left shoulder was hurting, pt also c/o abdominal pain. Pt took 1 ASA PTA. ).    Chief Complaint   Patient presents with    Chest Pain     Pt to ED for chest pain, pt states he was opening boxes at work when the pain started. Pt states last week his left shoulder was hurting, pt also c/o abdominal pain. Pt took 1 ASA PTA.        He is being admitted for:    Chest pain    His ED problem list included:    1. Chest pain, unspecified type        Past Medical History:   Diagnosis Date    Cerebral artery occlusion with cerebral infarction (HCC)     TIA    Cerebrovascular accident (CVA) due to embolism of right middle cerebral artery (HCC) 6/25/2020    Chronic pain of left thumb 8/6/2019    Hearing loss     Hepatitis A     Hyperlipidemia     S/P patent foramen ovale closure 6/25/2020    Sensorineural hearing loss, bilateral 10/9/2020    Trigger finger of left thumb 9/19/2019    Trigger finger of right thumb 12/5/2019       Past Surgical History:   Procedure Laterality Date    BACK SURGERY      CARDIAC SURGERY      cath - repair PFO    CHOLECYSTECTOMY      COLONOSCOPY  2007    FINGER TRIGGER RELEASE Right 7/22/2020    RIGHT TRIGGER THUMB RELEASE performed by Mina Duggan MD at [facility] OR    TONSILLECTOMY         His recent abnormal labs were:    Labs Reviewed   COMPREHENSIVE METABOLIC PANEL W/ REFLEX TO MG FOR LOW K - Abnormal; Notable for the following components:       Result Value    Glucose 104 (*)     Creatinine 0.7 (*)     All other components within normal limits   CBC WITH AUTO DIFFERENTIAL   TROPONIN   LIPASE   TROPONIN   D-DIMER, QUANTITATIVE   COMPREHENSIVE METABOLIC PANEL W/ REFLEX TO MG FOR LOW K   CBC WITH AUTO DIFFERENTIAL       His vital signs for the encounter were:    Patient Vitals for the past 24 hrs:   BP Temp Temp src

## 2024-02-21 NOTE — DISCHARGE INSTRUCTIONS
FOLLOW-UP APPOINTMENTS    [facility] - Appointment on 3/19/2024 at 1pm with Diane Enzweiler , [facility].  [facility],  [facility], [address], [address], [place] [address].  Office #: [phone].  If you are unable to make this appointment, please call to reschedule.    Directions to [place] Office  [address] [place] exit. Right off exit. Cross over [place]. Right on [address]. Left into hospital. Follow the signs to the emergency room ( turn left toward the Emergency room).  Go right at the first stop sign.  Just past the Emergency room at the second stop sign turn right and go up the ramp and park on the top level if possible.  Go in the glass doors of the [facility] on the top level of the garage [address].  As soon as you get in the door turn left and our office is the one with the glass doors.

## 2024-02-21 NOTE — DISCHARGE SUMMARY
ml    Stroke volume :70 ml    LV mass :145 gr  Medical History  Signatures   ------------------------------------------------------------------  Electronically signed by Chuck Headley MD (Interpreting physician)  on 02/21/2024 at 12:42  ------------------------------------------------------------------      XR CHEST PORTABLE    Result Date: 2/20/2024  EXAMINATION: ONE XRAY VIEW OF THE CHEST 2/20/2024 2:47 pm COMPARISON: None. HISTORY: ORDERING SYSTEM PROVIDED HISTORY: chest pain TECHNOLOGIST PROVIDED HISTORY: Reason for exam:->chest pain Reason for Exam: chest pain FINDINGS: Heart size is normal  Aorta is normal.  Lungs are normally expanded and clear. No pleural effusions. Mild spondylosis     Lungs are clear       Consults:     IP CONSULT TO HOSPITALIST  IP CONSULT TO CARDIOLOGY    Labs:     Recent Labs     02/20/24  1444 02/21/24  0527   WBC 9.9 8.4   HGB 15.6 14.7   HCT 45.3 43.3    256     Recent Labs     02/20/24  1444 02/21/24  0527    140   K 3.8 3.7    106   CO2 28 25   BUN 16 15   CREATININE 0.7* 0.6*   CALCIUM 9.5 9.0     Recent Labs     02/20/24  1444 02/20/24  1547   TROPHS 10 10     No results for input(s): \"LABA1C\" in the last 72 hours.  Recent Labs     02/20/24  1444 02/21/24  0527   AST 21 20   ALT 27 21   BILITOT 0.4 0.5   ALKPHOS 101 86     No results for input(s): \"INR\", \"LACTA\", \"TSH\" in the last 72 hours.    Urine Cultures: No results found for: \"LABURIN\"  Blood Cultures: No results found for: \"BC\"  No results found for: \"BLOODCULT2\"  Organism: No results found for: \"ORG\"    Signed:    Chantelle Turcios APRN

## 2024-02-21 NOTE — PROCEDURES
CARDIAC CATHETERIZATION REPORT     Procedure Date:  2024  Patient Name: Tejinder Cosme  MRN: [id number] : 1960      INDICATION     Chest pain  Abnl stress tet    PROCEDURES PERFORMED     Left heart catheterization  LVgram  Coronary angiogam  Coronary cath  Monitoring of moderate conscious sedation        PROCEDURE DESCRIPTION   Risks/benefits/alternatives/outcomes were discussed with patient and/or family and informed consent was obtained.  Using the Barbeau scale, the patient's right radial artery was found to be a level B.  Patient was prepped draped in the usual sterile fashion.  Local anaesthetic was applied over puncture site.  Using a back wall technique, a 6 Mauritanian Terumo sheath was inserted into right radial artery.  Verapamil, nitroglycerin were administered through the sheath.  Heparin was administered.  Diagnostic 5 Vietnamese pigtail, ultra catheters were used for diagnostic angiograms.  At the conclusion of the procedure, a TR band was placed over the puncture site and hemostasis was obtained.  There were no immediate complications. I supervised sedation from 2:22 PM to 2:37 PM with versed 1 mg/fentanyl 50 mcg during the procedure. An independent trained observer pushed meds at my direction.  We monitored the patient's level of consciousness and vital signs/physiologic status throughout the procedure duration (see times listed previously).  120 cc contrast was utilized. <20cc EBL.      FINDINGS       LVGRAM    LVEDP 5   GRADIENT ACROSS AORTIC VALVE None   LV FUNCTION EF 60%   WALL MOTION Normal   MITRAL REGURGITATION Mild       CORONARY ARTERIES    LM Less than 10% proximal/mid stenosis, distally there is 20% stenosis.       LAD Less than 10% [id number]-fnf-[id number] stenosis         LCX Ostial/proximal 30% stenosis, mid-distal less than 10% stenosis, vessel is large.         RCA Dominant, 10 to 20% [id number] stenosis             CONCLUSIONS:     Mild cad/ashd  Tx medically,

## 2024-02-21 NOTE — CARE COORDINATION
Chart reviewed.  Pt in Stress testing this am and cath lab this afternoon.  Discussed w RN and attending. Pt is from home and [facility].  Likely no needs from CM.  Ly Zaragoza RN

## 2024-02-21 NOTE — H&P
Full range of motion without deformity.  Neurologic:  Neurovascularly intact without any focal sensory/motor deficits. Cranial nerves: II-XII intact, grossly non-focal.  Psychiatric:  Alert and oriented, thought content appropriate, normal insight  Skin:  Skin color, texture, turgor normal.  No rashes or lesions.  Capillary Refill:  Brisk,< 3 seconds   Peripheral Pulses:  +2 palpable, equal bilaterally     Diet: [x]Adult/General  []Cardiac  []Diabetic  []Low Fat  []NPO  [x]NPO after Midnight  []Other   DVT Prophylaxis: [x]PPx LMWH  []SQ Heparin  []IPC/SCDs  []Eliquis  []Xarelto  []Coumadin     Code status: [x]Full  []DNR/CCA  []Limited (DNR/CCA with Do Not Intubate)  []DNRCC  PT/OT Eval Status:   []NOT yet ordered  []Ordered and Pending   []Seen with Recommendations for:  []Home independently  []Home w/ assist  []HHC  []SNF  []Acute Rehab    Anticipated Discharge Day/Date:  1 day     Anticipated Discharge Location: [x]Home  []HHC  []SNF  []Acute Rehab  []ECF  []LTAC  []Hospice    Consults:      IP CONSULT TO HOSPITALIST  IP CONSULT TO CARDIOLOGY      This patient has a high likelihood of being discharged tomorrow and is appropriate for A1 Discharge Unit in AM pending clinical course overnight: []Yes  []No    --------------------------------------------------------------------------------------------------------------------------------------------------------------------    Imaging:     XR CHEST PORTABLE    Result Date: 2/20/2024  EXAMINATION: ONE XRAY VIEW OF THE CHEST 2/20/2024 2:47 pm COMPARISON: None. HISTORY: ORDERING SYSTEM PROVIDED HISTORY: chest pain TECHNOLOGIST PROVIDED HISTORY: Reason for exam:->chest pain Reason for Exam: chest pain FINDINGS: Heart size is normal  Aorta is normal.  Lungs are normally expanded and clear. No pleural effusions. Mild spondylosis     Lungs are clear       PCP: Angela Stephens PA    Past Medical History:        Diagnosis Date    Cerebral artery occlusion with cerebral

## 2024-02-21 NOTE — PROGRESS NOTES
Brief Pre-Op Note/Sedation Assessment      Tejinder Cosme  1960  [id number]  2:21 PM    Planned Procedure: Cardiac Catheterization Procedure  Post Procedure Plan: Return to same level of care  Consent: I have discussed with the patient and/or the patient representative the indication, alternatives, and the possible risks and/or complications of the planned procedure and the anesthesia methods. The patient and/or patient representative appear to understand and agree to proceed.    DISCUSSION OF CARDIAC CATHETERIZATION PROCEDURES: The procedures, indications, risks and alternatives have been discussed with the patient and, as appropriate, with the patient's guardian . Risks discussed included, but are not limited to, bleeding, development of blood clots/emboli, damage to blood vessels, renal failure, malignant cardiac arrhythmias, stroke, heart attack, emergent coronary bypass surgery, death, dye allergy.  The patient (and guardian as appropriate) expressed understanding of the aforementioned and wished to proceed.        Chief Complaint:   Chest Pain/Pressure      Indications for Cath Procedure:  Presentation:  ACS > 24 hrs  2.  Anginal Classification within 2 weeks:  CCS IV - Inability to perform any activity without angina or angina at rest, i.e., severe limitation  3.  Angina Symptoms Assessment:  Typical Chest Pain  4.  Heart Failure Class within last 2 weeks:  No symptoms  5.  Cardiovascular Instability:  No    Prior Ischemic Workup/Eval:  Pre-Procedural Medications: Yes: Aspirin and STATIN  2.   Stress Test Completed?  Yes:  Stress or Imaging Studies Performed (within ANY time period):   Type:  Stress Nuclear  Results:  Positive:  Myocardial Perfusion Defects (Nuclear) Extent of Ischemia:  Intermediate    Does Patient need surgery?  Cath Valve Surgery:  No    Pre-Procedure Medical History:  Vital Signs:  /82   Pulse 86   Temp 97.9 °F (36.6 °C) (Oral)   Resp 16   Ht 1.829 m (6')   Wt 79.4 
/86 map 113, HR 78 sinus on tele, spo2 96% on RA. Denies pain at this time, no c/o any at this time. [place] notified via perfect serve, ok to cont to monitor.   
A GXT MYOVIEW stress test was completed on this patient as ordered. The patient tolerated the procedure well.  Awaiting stress imaging at this time.   
CMU notified patient transporting to Nuclear Medicine for Stress Test. Electronically signed by Deepika Fountain RN on 2/21/2024 at 9:13 AM    
Consent obtained for left heart cath. No questions or concerns at this time. Electronically signed by Deepika Fountain RN on 2/21/2024 at 1:52 PM    
Pt d/c'd home via private vehicle.  Removed peripheral IV and stopped bleeding.  Catheter intact. Pt tolerated well. No redness noted at site.  Notified CMU and removed tele box. Reviewed d/c instructions, home meds, and  f/u information utilizing teach-back method. Patient verbalized understanding.      
Report given to BRANDO Lloyd RN. Electronically signed by Deepika Fountain RN on 2/21/2024 at 2:37 PM   
Vitals:    02/21/24 1453   BP: 117/72   Pulse: 83   Resp: 18   Temp: 97.3 °F (36.3 °C)   SpO2: 100%        Pt arrived from Cath Lab. VSS. Pt denies pain. Right radial site clean and dry with TR band in place. Family at bedside. Report given to Prema merida RN. CMU verified.   
02/20/24  1444 02/20/24  1547   TROPHS 10 10     No results for input(s): \"LABA1C\" in the last 72 hours.  Recent Labs     02/20/24  1444 02/21/24  0527   AST 21 20   ALT 27 21   BILITOT 0.4 0.5   ALKPHOS 101 86     No results for input(s): \"INR\", \"LACTA\", \"TSH\" in the last 72 hours.    Urine Cultures: No results found for: \"LABURIN\"  Blood Cultures: No results found for: \"BC\"  No results found for: \"BLOODCULT2\"  Organism: No results found for: \"ORG\"      Chantelle Turcios, APRN

## 2024-02-22 ENCOUNTER — TELEPHONE (OUTPATIENT)
Dept: FAMILY MEDICINE CLINIC | Age: 64
End: 2024-02-22

## 2024-02-22 ENCOUNTER — PATIENT MESSAGE (OUTPATIENT)
Dept: FAMILY MEDICINE CLINIC | Age: 64
End: 2024-02-22

## 2024-02-22 LAB — POC ACT LR: 376 SEC

## 2024-02-22 NOTE — TELEPHONE ENCOUNTER
Pended referral for [facility] ([facility] to sign. Does pt need appointment for referral?

## 2024-02-22 NOTE — TELEPHONE ENCOUNTER
From: Tejinder Cosme  To: Angela Stephens  Sent: 2/22/2024 10:17 AM EST  Subject: Physical therapy prescription.     Hello, I have had shoulder blade pain for a little over a week. I can move my arm with no issue, but then at the end of the day and overnight it hurts tremendously. I have scheduled an appointment with [facility]. They said they need a script. Can you send that or would I need to come in? I do have an appointment scheduled with you on March 11th. I was hoping to be able to start therapy before that though. Thank you very much. [facility] fax# [phone]

## 2024-02-22 NOTE — TELEPHONE ENCOUNTER
Pt was informed that he needs appointment for physical therapy orders. Moved pt's appointment to 03/05/24.

## 2024-02-22 NOTE — TELEPHONE ENCOUNTER
Care Transitions Initial Follow Up Call    Outreach made within 2 business days of discharge: Yes    Patient: Tejinder Cosme Patient : 1960   MRN: [id number]  Reason for Admission: There are no discharge diagnoses documented for the most recent discharge.  Discharge Date: 24       Spoke with: Tejinder Cosme    Discharge department/facility: [facility] Interactive Patient Contact:  Was patient able to fill all prescriptions: Yes  Was patient instructed to bring all medications to the follow-up visit: Yes  Is patient taking all medications as directed in the discharge summary? Yes  Does patient understand their discharge instructions: Yes  Does patient have questions or concerns that need addressed prior to 7-14 day follow up office visit: no    Scheduled appointment with PCP within 7-14 days    Follow Up  Future Appointments   Date Time Provider Department Center   3/5/2024  8:00 AM Angela Stephens PA [facility] [place] [facility]   3/19/2024  1:00 PM Enzweiler, Diane M, APRN - CNP [facility]   2024  8:00 AM Dorothy Peraza AuD [facility]       Sunny Alvarez MA

## 2024-03-05 ENCOUNTER — OFFICE VISIT (OUTPATIENT)
Dept: FAMILY MEDICINE CLINIC | Age: 64
End: 2024-03-05

## 2024-03-05 VITALS
BODY MASS INDEX: 25.17 KG/M2 | HEART RATE: 77 BPM | DIASTOLIC BLOOD PRESSURE: 82 MMHG | HEIGHT: 72 IN | OXYGEN SATURATION: 98 % | WEIGHT: 185.8 LBS | RESPIRATION RATE: 16 BRPM | SYSTOLIC BLOOD PRESSURE: 120 MMHG

## 2024-03-05 DIAGNOSIS — R07.9 CHEST PAIN, UNSPECIFIED TYPE: ICD-10-CM

## 2024-03-05 DIAGNOSIS — M25.512 ACUTE PAIN OF LEFT SHOULDER: ICD-10-CM

## 2024-03-05 DIAGNOSIS — Z09 HOSPITAL DISCHARGE FOLLOW-UP: Primary | ICD-10-CM

## 2024-03-05 DIAGNOSIS — Z12.5 PROSTATE CANCER SCREENING: ICD-10-CM

## 2024-03-05 DIAGNOSIS — E78.2 MIXED HYPERLIPIDEMIA: ICD-10-CM

## 2024-03-05 LAB — PSA SERPL DL<=0.01 NG/ML-MCNC: 0.73 NG/ML (ref 0–4)

## 2024-03-05 SDOH — ECONOMIC STABILITY: INCOME INSECURITY: HOW HARD IS IT FOR YOU TO PAY FOR THE VERY BASICS LIKE FOOD, HOUSING, MEDICAL CARE, AND HEATING?: NOT HARD AT ALL

## 2024-03-05 SDOH — ECONOMIC STABILITY: FOOD INSECURITY: WITHIN THE PAST 12 MONTHS, THE FOOD YOU BOUGHT JUST DIDN'T LAST AND YOU DIDN'T HAVE MONEY TO GET MORE.: NEVER TRUE

## 2024-03-05 SDOH — ECONOMIC STABILITY: FOOD INSECURITY: WITHIN THE PAST 12 MONTHS, YOU WORRIED THAT YOUR FOOD WOULD RUN OUT BEFORE YOU GOT MONEY TO BUY MORE.: NEVER TRUE

## 2024-03-05 ASSESSMENT — ENCOUNTER SYMPTOMS: RESPIRATORY NEGATIVE: 1

## 2024-03-05 NOTE — PROGRESS NOTES
KACEY Dubon   Medication Sig Dispense Refill    atorvastatin (LIPITOR) 80 MG tablet TAKE 1 TABLET BY MOUTH EVERY DAY 90 tablet 1    aspirin 81 MG EC tablet TAKE 1 TABLET (81 MG TOTAL) BY MOUTH DAILY WITH BREAKFAST.  3        Medications patient taking as of now reconciled against medications ordered at time of hospital discharge: Yes    Review of Systems   Constitutional: Negative.    Respiratory: Negative.     Cardiovascular: Negative.    Musculoskeletal:         Left shoulder pain       Objective:    There were no vitals taken for this visit.  Physical Exam  Constitutional:       Appearance: Normal appearance.   Cardiovascular:      Rate and Rhythm: Normal rate and regular rhythm.      Heart sounds: Normal heart sounds.   Pulmonary:      Effort: Pulmonary effort is normal.      Breath sounds: Normal breath sounds.   Musculoskeletal:      Comments: Full ROM of shoulder without pain, no joint line tenderness   Neurological:      General: No focal deficit present.      Mental Status: He is alert and oriented to person, place, and time.         An electronic signature was used to authenticate this note.     Diagnosis Orders   1. Hospital discharge follow-up  OH DISCHARGE MEDS RECONCILED W/ CURRENT OUTPATIENT MED LIST      2. Mixed hyperlipidemia  Lipid Panel- controlled on lipitor    Comprehensive Metabolic Panel      3. Acute pain of left shoulder  External Referral To Physical Therapy      4. Prostate cancer screening  PSA Screening      5. Chest pain, unspecified type  Appears to have resolved, likely referred pain from shoulder          --KACEY Fuentes

## 2024-03-06 LAB
ALBUMIN SERPL-MCNC: 4.5 G/DL (ref 3.4–5)
ALBUMIN/GLOB SERPL: 2 {RATIO} (ref 1.1–2.2)
ALP SERPL-CCNC: 101 U/L (ref 40–129)
ALT SERPL-CCNC: 24 U/L (ref 10–40)
ANION GAP SERPL CALCULATED.3IONS-SCNC: 10 MMOL/L (ref 3–16)
AST SERPL-CCNC: 20 U/L (ref 15–37)
BILIRUB SERPL-MCNC: 0.6 MG/DL (ref 0–1)
BUN SERPL-MCNC: 15 MG/DL (ref 7–20)
CALCIUM SERPL-MCNC: 9.4 MG/DL (ref 8.3–10.6)
CHLORIDE SERPL-SCNC: 106 MMOL/L (ref 99–110)
CHOLEST SERPL-MCNC: 129 MG/DL (ref 0–199)
CO2 SERPL-SCNC: 24 MMOL/L (ref 21–32)
CREAT SERPL-MCNC: 0.8 MG/DL (ref 0.8–1.3)
GFR SERPLBLD CREATININE-BSD FMLA CKD-EPI: >60 ML/MIN/{1.73_M2}
GLUCOSE SERPL-MCNC: 109 MG/DL (ref 70–99)
HDLC SERPL-MCNC: 37 MG/DL (ref 40–60)
LDLC SERPL CALC-MCNC: 72 MG/DL
POTASSIUM SERPL-SCNC: 4 MMOL/L (ref 3.5–5.1)
PROT SERPL-MCNC: 6.8 G/DL (ref 6.4–8.2)
SODIUM SERPL-SCNC: 140 MMOL/L (ref 136–145)
TRIGL SERPL-MCNC: 101 MG/DL (ref 0–150)
VLDLC SERPL CALC-MCNC: 20 MG/DL

## 2024-03-11 ENCOUNTER — OFFICE VISIT (OUTPATIENT)
Dept: FAMILY MEDICINE CLINIC | Age: 64
End: 2024-03-11
Payer: MEDICARE

## 2024-03-11 VITALS
SYSTOLIC BLOOD PRESSURE: 122 MMHG | WEIGHT: 184.4 LBS | RESPIRATION RATE: 16 BRPM | OXYGEN SATURATION: 98 % | HEIGHT: 71 IN | BODY MASS INDEX: 25.81 KG/M2 | DIASTOLIC BLOOD PRESSURE: 76 MMHG | HEART RATE: 83 BPM

## 2024-03-11 DIAGNOSIS — Z00.00 WELCOME TO MEDICARE PREVENTIVE VISIT: Primary | ICD-10-CM

## 2024-03-11 PROCEDURE — 1123F ACP DISCUSS/DSCN MKR DOCD: CPT | Performed by: PHYSICIAN ASSISTANT

## 2024-03-11 PROCEDURE — G0402 INITIAL PREVENTIVE EXAM: HCPCS | Performed by: PHYSICIAN ASSISTANT

## 2024-03-11 RX ORDER — ATORVASTATIN CALCIUM 80 MG/1
80 TABLET, FILM COATED ORAL DAILY
Qty: 90 TABLET | Refills: 1 | Status: SHIPPED | OUTPATIENT
Start: 2024-03-11

## 2024-03-11 SDOH — ECONOMIC STABILITY: FOOD INSECURITY: WITHIN THE PAST 12 MONTHS, THE FOOD YOU BOUGHT JUST DIDN'T LAST AND YOU DIDN'T HAVE MONEY TO GET MORE.: NEVER TRUE

## 2024-03-11 SDOH — ECONOMIC STABILITY: INCOME INSECURITY: HOW HARD IS IT FOR YOU TO PAY FOR THE VERY BASICS LIKE FOOD, HOUSING, MEDICAL CARE, AND HEATING?: NOT HARD AT ALL

## 2024-03-11 SDOH — ECONOMIC STABILITY: FOOD INSECURITY: WITHIN THE PAST 12 MONTHS, YOU WORRIED THAT YOUR FOOD WOULD RUN OUT BEFORE YOU GOT MONEY TO BUY MORE.: NEVER TRUE

## 2024-03-11 ASSESSMENT — PATIENT HEALTH QUESTIONNAIRE - PHQ9
SUM OF ALL RESPONSES TO PHQ9 QUESTIONS 1 & 2: 0
1. LITTLE INTEREST OR PLEASURE IN DOING THINGS: 0
SUM OF ALL RESPONSES TO PHQ QUESTIONS 1-9: 0
2. FEELING DOWN, DEPRESSED OR HOPELESS: 0

## 2024-03-11 ASSESSMENT — LIFESTYLE VARIABLES
HOW OFTEN DO YOU HAVE A DRINK CONTAINING ALCOHOL: NEVER
HOW MANY STANDARD DRINKS CONTAINING ALCOHOL DO YOU HAVE ON A TYPICAL DAY: PATIENT DOES NOT DRINK

## 2024-03-11 NOTE — PROGRESS NOTES
Medicare Annual Wellness Visit    Tejinder Cosme is here for Medicare AWV    Assessment & Plan   Welcome to Medicare preventive visit  Recommendations for Preventive Services Due: see orders and patient instructions/AVS.  Recommended screening schedule for the next 5-10 years is provided to the patient in written form: see Patient Instructions/AVS.     No follow-ups on file.     Subjective       Patient's complete Health Risk Assessment and screening values have been reviewed and are found in Flowsheets. The following problems were reviewed today and where indicated follow up appointments were made and/or referrals ordered.    Positive Risk Factor Screenings with Interventions:               General HRA Questions:  Select all that apply: (!) Stress (work related stress)    Stress Interventions:  Patient comments: very busy at work                          Objective   Vitals:    03/11/24 0807   BP: 122/76   Pulse: 83   Resp: 16   SpO2: 98%   Weight: 83.6 kg (184 lb 6.4 oz)   Height: 1.798 m (5' 10.8\")      Body mass index is 25.86 kg/m².        General Appearance: alert and oriented to person, place and time, well-developed and well-nourished, in no acute distress  Pulmonary/Chest: clear to auscultation bilaterally- no wheezes, rales or rhonchi, normal air movement, no respiratory distress  Cardiovascular: normal rate, normal S1 and S2, no gallops, intact distal pulses, and no carotid bruits       Allergies   Allergen Reactions    Hydrocodone-Acetaminophen Other (See Comments)     Fatigue and confusion.    Vicodin [Hydrocodone-Acetaminophen] Other (See Comments)     Fatigue and confusion.     Prior to Visit Medications    Medication Sig Taking? Authorizing Provider   atorvastatin (LIPITOR) 80 MG tablet TAKE 1 TABLET BY MOUTH EVERY DAY Yes nAgela Stephens PA   aspirin 81 MG EC tablet TAKE 1 TABLET (81 MG TOTAL) BY MOUTH DAILY WITH BREAKFAST. Yes Provider, MD Osorio Sun (Including outside

## 2024-03-11 NOTE — PATIENT INSTRUCTIONS
your family, and various assessments and screenings as appropriate.    After reviewing your medical record and screening and assessments performed today your provider may have ordered immunizations, labs, imaging, and/or referrals for you.  A list of these orders (if applicable) as well as your Preventive Care list are included within your After Visit Summary for your review.    Other Preventive Recommendations:    A preventive eye exam performed by an eye specialist is recommended every 1-2 years to screen for glaucoma; cataracts, macular degeneration, and other eye disorders.  A preventive dental visit is recommended every 6 months.  Try to get at least 150 minutes of exercise per week or 10,000 steps per day on a pedometer .  Order or download the FREE \"Exercise & Physical Activity: Your Everyday Guide\" from [employer]. Call 1-553.390.1141 or search [employer] online.  You need 2331-4948 mg of calcium and 6179-5978 IU of vitamin D per day. It is possible to meet your calcium requirement with diet alone, but a vitamin D supplement is usually necessary to meet this goal.  When exposed to the sun, use a sunscreen that protects against both UVA and UVB radiation with an SPF of 30 or greater. Reapply every 2 to 3 hours or after sweating, drying off with a towel, or swimming.  Always wear a seat belt when traveling in a car. Always wear a helmet when riding a bicycle or motorcycle.

## 2024-03-15 NOTE — PROGRESS NOTES
atrium: No evidence of thrombus in the atrial cavity or     appendage. There is an oval shape PFO with right to left shunt     with contrast. There is no spontaneous left to right shunting with     color Doppler.   - Right ventricle: Systolic function was normal.   - Cardiac MRI is recommended for further evaluation of the aortic     valve.        Assessment:    1. Coronary artery disease involving native coronary artery of native heart without angina pectoris  -mild on recent cath in 2/2024 (after an abnormal stress test: false positive)  -nothing to suggest angina  -risk factor modification  -continue ASA and statin    2. PFO (patent foramen ovale)  -S/P Amplatz closure in 2019  -echo in 2/2024: closure device well seated and no residual shunting by bubble study    3. Hyperlipidemia LDL goal <70  -on statin  -lipids followed by PCP    Plan:  Continue ASA and statin  Discussed low fat/low sodium diet and reinforced regular aerobic exercise.  Labs per PCP  Follow up in 1 year or sooner if needed with Dr. Headley or BENI    Return in about 1 year (around 3/19/2025) for with BENI or Dr. Headley.     Thanks for allowing me to participate in the care of this patient.      Diane Enzweiler, APRN-CNP  [facility], [place]  [address]  [place] [address]  Office: [phone]  Fax: [phone]      Electronically signed by DIANE M ENZWEILER, APRN - CNP on 3/19/2024 at 1:25 PM

## 2024-03-19 ENCOUNTER — OFFICE VISIT (OUTPATIENT)
Dept: CARDIOLOGY CLINIC | Age: 64
End: 2024-03-19
Payer: MEDICARE

## 2024-03-19 VITALS
OXYGEN SATURATION: 97 % | DIASTOLIC BLOOD PRESSURE: 68 MMHG | HEIGHT: 70 IN | SYSTOLIC BLOOD PRESSURE: 126 MMHG | WEIGHT: 188 LBS | HEART RATE: 78 BPM | BODY MASS INDEX: 26.92 KG/M2

## 2024-03-19 DIAGNOSIS — E78.5 HYPERLIPIDEMIA LDL GOAL <70: ICD-10-CM

## 2024-03-19 DIAGNOSIS — I25.10 CORONARY ARTERY DISEASE INVOLVING NATIVE CORONARY ARTERY OF NATIVE HEART WITHOUT ANGINA PECTORIS: Primary | ICD-10-CM

## 2024-03-19 DIAGNOSIS — Q21.12 PFO (PATENT FORAMEN OVALE): ICD-10-CM

## 2024-03-19 PROCEDURE — 99213 OFFICE O/P EST LOW 20 MIN: CPT | Performed by: NURSE PRACTITIONER

## 2024-03-19 PROCEDURE — 1123F ACP DISCUSS/DSCN MKR DOCD: CPT | Performed by: NURSE PRACTITIONER

## 2024-04-24 ENCOUNTER — PROCEDURE VISIT (OUTPATIENT)
Dept: AUDIOLOGY | Age: 64
End: 2024-04-24
Payer: MEDICARE

## 2024-04-24 DIAGNOSIS — H90.3 SENSORINEURAL HEARING LOSS, BILATERAL: Primary | ICD-10-CM

## 2024-04-24 PROCEDURE — 92593 PR HEARING AID CHECK BINAURAL: CPT | Performed by: AUDIOLOGIST

## 2024-04-24 NOTE — PROGRESS NOTES
Tejinder Cosme  1960.63 y.o. male   [id number]    RIGHT EAR: /MODEL: Phonak Audeo P70-R  SN: [id number]  EARMOLD/TUBING/WIRE/DOME: 2(M) with large open domes    LEFT EAR: /MODEL: Phonak Audeo P70-R  SN: [id number]  EARMOLD/TUBING/WIRE/DOME: 2(M) with large vented domes    HAF: 11/27/2020  30 DAY RIGHT-TO-RETURN: 12/27/2020  WARRANTY: 2/17/2023    HEARING AID CHECK    Tejinder Cosme was seen today, 4/24/2024, for a hearing aid check appointment    PROCEDURES:     Otoscopy revealed: Clear ear canals bilaterally    Patient noted left device intermittent/not producing sound.  Hearing aids were cleaned and checked.  Troubleshooting putting right  on left device revealed left receive not working. Microphone and  ports were suctioned, domes and wax traps were changed, and devices completed a desiccant cycle through [employer]. Replaced 2(M)  on left device. Post-cleaning listening check revealed good function of the devices.       Connected devices to fitting software.   Datalogging revealed 10 hours of use per day.    PATIENT EDUCATION:     - Verbally and visually reviewed importance of consistent use and care and maintenance of devices.      Information was verbally shared with patient during appointment.        RECOMMENDATIONS:     Continue consistent hearing aid use  Return for hearing aid checks as needed  Retest hearing as medically indicated and/or sooner if a change in hearing is noted.  Contact audiologist with questions/concerns as needed    **Patient paid $60.00 for hearing aid check fee today's appointment.    Cathy Andrew  Audiologist

## 2024-09-12 RX ORDER — ATORVASTATIN CALCIUM 80 MG/1
80 TABLET, FILM COATED ORAL DAILY
Qty: 90 TABLET | Refills: 1 | Status: SHIPPED | OUTPATIENT
Start: 2024-09-12

## 2024-10-16 ENCOUNTER — PROCEDURE VISIT (OUTPATIENT)
Dept: AUDIOLOGY | Age: 64
End: 2024-10-16

## 2024-10-16 DIAGNOSIS — H90.3 SENSORINEURAL HEARING LOSS, BILATERAL: Primary | ICD-10-CM

## 2024-10-16 PROCEDURE — 92567 TYMPANOMETRY: CPT | Performed by: AUDIOLOGIST

## 2024-10-16 PROCEDURE — 92557 COMPREHENSIVE HEARING TEST: CPT | Performed by: AUDIOLOGIST

## 2024-10-16 PROCEDURE — 92593 PR HEARING AID CHECK BINAURAL: CPT | Performed by: AUDIOLOGIST

## 2024-10-16 NOTE — PROGRESS NOTES
Tejinder Cosem   1960, 63 y.o. male   [id number]       Reason for Visit: Evaluation of suspected change in hearing    ADULT AUDIOLOGIC EVALUATION      Tejinder Cosme is a 63 y.o. male seen today, 10/16/2024 , for a recheck audiologic evaluation.  5/17/23, 10/9/20    AUDIOLOGIC AND OTHER PERTINENT MEDICAL HISTORY:      Tejinder Cosme noted possible change in hearing as feels he may not be hearing as well out of left device. Per previous evaluation, he notes asymmetric hearing loss (LE>RE) and family history of hearing loss. Overall benefit with hearing aids. No additional significant otologic or medical history was reported.      Tejinder Cosme denied otalgia, aural fullness, otorrhea, tinnitus, dizziness, imbalance, history of falls, history of occupational/recreational noise exposure, history of head trauma and history of ear surgery.    Date: 10/16/2024     IMPRESSIONS:      Today's results revealed an asymmetric sensorineural hearing loss, bilaterally. Hearing essentially stable with slight decline, bilaterally. Patient seen for programming adjustments and hearing aid check following today's evaluation.     ASSESSMENT AND FINDINGS:     Otoscopy revealed: Clear ear canals bilaterally    RIGHT EAR:  Hearing Sensitivity:Within normal limits through 1kHz precipitously sloping to a mild to moderately-severe sensorineural hearing loss.   Speech Recognition Threshold: 15 dB HL  Word Recognition: Excellent 100%, based on NU-6 25-word list at 65 dBHL masked using recorded speech stimuli.    Tympanometry: Normal peak pressure with high compliance, Type Ad tympanogram, consistent with hypermobile tympanic membrane.      LEFT EAR:  Hearing Sensitivity:Normal limits at 250Hz sloping to a mild to moderate precipitously sloping to severe sensorineural hearing loss.   Speech Recognition Threshold: 25 dB HL  Word Recognition: Good 84%, based on NU-6 25-word list at 75 dBHL masked using recorded speech stimuli.

## 2024-10-16 NOTE — PROGRESS NOTES
Tejinder Cosme  1960.63 y.o. male   [id number]    RIGHT EAR: /MODEL: Phonak Audeo P70-R  SN: [id number]  EARMOLD/TUBING/WIRE/DOME: 2(M) with large vented domes    LEFT EAR: /MODEL: Phonak Audeo P70-R  SN: [id number]  EARMOLD/TUBING/WIRE/DOME: 2(M) with large vented domes    HAF: 11/27/2020  30 DAY RIGHT-TO-RETURN: 12/27/2020  WARRANTY: 2/17/2023    HEARING AID CHECK    Tejinder Cosme was seen today, 10/16/2024, for a hearing aid check appointment    PROCEDURES:     Otoscopy revealed: Clear ear canals bilaterally    Hearing aids were cleaned and checked.  A listening check revealed good function of the devices.  Microphone and  ports were suctioned, domes and wax traps were changed, and devices completed a desiccant cycle through [employer]. Post-cleaning listening check revealed good function of the devices.       Connected devices to fitting software. Changed programming for large vented dome in right ear. Rand feedback manager, AU. Live speech mapping was completed with a good match to NAL-NL2 targets from 250-8000 Hz, AU.     After adjustments, patient reported comfort and good sound quality. Recommenced patient return sooner if not hearing well with adjustments. Patient reported understanding.     PATIENT EDUCATION:     - Verbally and visually reviewed importance of consistent use and care and maintenance of devices.      Information was verbally shared with patient during appointment.        RECOMMENDATIONS:     Continue consistent hearing aid use  Return for hearing aid checks as needed  Retest hearing as medically indicated and/or sooner if a change in hearing is noted.  Contact audiologist with questions/concerns as needed      **Patient paid $60.00 for today's appointment. (Do not bill insurance)     Cathy Andrew  Audiologist

## 2024-12-20 ENCOUNTER — OFFICE VISIT (OUTPATIENT)
Dept: FAMILY MEDICINE CLINIC | Age: 64
End: 2024-12-20

## 2024-12-20 VITALS
WEIGHT: 182.6 LBS | OXYGEN SATURATION: 99 % | DIASTOLIC BLOOD PRESSURE: 72 MMHG | BODY MASS INDEX: 26.14 KG/M2 | HEIGHT: 70 IN | HEART RATE: 78 BPM | SYSTOLIC BLOOD PRESSURE: 108 MMHG | RESPIRATION RATE: 16 BRPM

## 2024-12-20 DIAGNOSIS — M54.50 ACUTE MIDLINE LOW BACK PAIN WITHOUT SCIATICA: Primary | ICD-10-CM

## 2024-12-20 RX ORDER — M-VIT,TX,IRON,MINS/CALC/FOLIC 27MG-0.4MG
1 TABLET ORAL DAILY
COMMUNITY

## 2024-12-20 RX ORDER — METHYLPREDNISOLONE 4 MG/1
TABLET ORAL
Qty: 1 KIT | Refills: 0 | Status: SHIPPED | OUTPATIENT
Start: 2024-12-20 | End: 2024-12-26

## 2024-12-20 ASSESSMENT — PATIENT HEALTH QUESTIONNAIRE - PHQ9
SUM OF ALL RESPONSES TO PHQ QUESTIONS 1-9: 0
2. FEELING DOWN, DEPRESSED OR HOPELESS: NOT AT ALL
SUM OF ALL RESPONSES TO PHQ QUESTIONS 1-9: 0
1. LITTLE INTEREST OR PLEASURE IN DOING THINGS: NOT AT ALL
SUM OF ALL RESPONSES TO PHQ9 QUESTIONS 1 & 2: 0

## 2024-12-20 ASSESSMENT — ENCOUNTER SYMPTOMS: BACK PAIN: 1

## 2024-12-20 NOTE — PROGRESS NOTES
Subjective   Patient ID: Tejinder Cosme is a 64 y.o. male.    HPI    Patient presents with low back pain that started on 11/28, started with a slight twinge and has gradually progressed. The pain is middle of the back. Carried a hose on 12/17 and now the pain is intense. There is some radiation into the hips and upper thighs. Has had back pain in the past with surgery in 2001- fusion. Has taken advil with no real relief and using ice.       Review of Systems   Musculoskeletal:  Positive for back pain.          Objective   Physical Exam  Constitutional:       Appearance: Normal appearance. He is normal weight.   Musculoskeletal:      Lumbar back: No spasms or tenderness. Decreased range of motion. Positive left straight leg raise test. Negative right straight leg raise test.      Comments: Marked ROM difficulties, slow altered gait   Neurological:      Mental Status: He is alert.            Assessment      Diagnosis Orders   1. Acute midline low back pain without sciatica  Ice, will start medrol dose pack to follow up by Monday if not improving, may need imaging            KACEY Fuentes

## 2024-12-31 ENCOUNTER — PROCEDURE VISIT (OUTPATIENT)
Dept: AUDIOLOGY | Age: 64
End: 2024-12-31
Payer: MEDICARE

## 2024-12-31 DIAGNOSIS — H90.3 SENSORINEURAL HEARING LOSS, BILATERAL: Primary | ICD-10-CM

## 2024-12-31 PROCEDURE — 92593 PR HEARING AID CHECK BINAURAL: CPT | Performed by: AUDIOLOGIST

## 2024-12-31 NOTE — PROGRESS NOTES
Tejinder Cosme  1960.64 y.o. male   [id number]    HEARING AID DROP OFF    RIGHT EAR: /MODEL: Phonak Audeo P70-R  SN: [id number]  EARMOLD/TUBING/WIRE/DOME: 2(M) with large vented domes    LEFT EAR: /MODEL: Phonak Audeo P70-R  SN: [id number]  EARMOLD/TUBING/WIRE/DOME: 2(M) with large vented domes    HAF: 11/27/2020  30 DAY RIGHT-TO-RETURN: 12/27/2020  WARRANTY: 2/17/2023    PROCEDURES:     Patient dropped off right hearing aid due to broken  wire.  Replaced and overall listening check was good.  Called patient to      **Patient's right hearing aid is at the  in [facility].    PATIENT EDUCATION:     - Verbally and visually reviewed importance of consistent use and care and maintenance of devices.      Information was verbally shared with patient during appointment.        RECOMMENDATIONS:     Continue consistent hearing aid use  Return for hearing aid checks as needed  Retest hearing as medically indicated and/or sooner if a change in hearing is noted.  Contact audiologist with questions/concerns as needed    **Patient owes $60.00 for drop off    Cathy Roberto  Audiologist

## 2025-01-22 ENCOUNTER — TELEPHONE (OUTPATIENT)
Dept: FAMILY MEDICINE CLINIC | Age: 65
End: 2025-01-22

## 2025-03-10 RX ORDER — ATORVASTATIN CALCIUM 80 MG/1
80 TABLET, FILM COATED ORAL DAILY
Qty: 90 TABLET | Refills: 1 | Status: SHIPPED | OUTPATIENT
Start: 2025-03-10

## 2025-03-10 NOTE — TELEPHONE ENCOUNTER
Tejinder Cosme is requesting refill(s) atorvastatin  Last OV 3/11/24 (pertaining to medication)  LR 9/12/24 (per medication requested)  Next office visit scheduled or attempted Yes   If no, reason:  3/17/25

## 2025-03-14 SDOH — ECONOMIC STABILITY: FOOD INSECURITY: WITHIN THE PAST 12 MONTHS, YOU WORRIED THAT YOUR FOOD WOULD RUN OUT BEFORE YOU GOT MONEY TO BUY MORE.: NEVER TRUE

## 2025-03-14 SDOH — ECONOMIC STABILITY: FOOD INSECURITY: WITHIN THE PAST 12 MONTHS, THE FOOD YOU BOUGHT JUST DIDN'T LAST AND YOU DIDN'T HAVE MONEY TO GET MORE.: NEVER TRUE

## 2025-03-14 SDOH — ECONOMIC STABILITY: INCOME INSECURITY: IN THE LAST 12 MONTHS, WAS THERE A TIME WHEN YOU WERE NOT ABLE TO PAY THE MORTGAGE OR RENT ON TIME?: NO

## 2025-03-14 SDOH — HEALTH STABILITY: PHYSICAL HEALTH: ON AVERAGE, HOW MANY DAYS PER WEEK DO YOU ENGAGE IN MODERATE TO STRENUOUS EXERCISE (LIKE A BRISK WALK)?: 5 DAYS

## 2025-03-14 SDOH — ECONOMIC STABILITY: TRANSPORTATION INSECURITY
IN THE PAST 12 MONTHS, HAS THE LACK OF TRANSPORTATION KEPT YOU FROM MEDICAL APPOINTMENTS OR FROM GETTING MEDICATIONS?: NO

## 2025-03-14 SDOH — HEALTH STABILITY: PHYSICAL HEALTH: ON AVERAGE, HOW MANY MINUTES DO YOU ENGAGE IN EXERCISE AT THIS LEVEL?: 30 MIN

## 2025-03-14 ASSESSMENT — LIFESTYLE VARIABLES
HOW MANY STANDARD DRINKS CONTAINING ALCOHOL DO YOU HAVE ON A TYPICAL DAY: PATIENT DOES NOT DRINK
HOW OFTEN DO YOU HAVE A DRINK CONTAINING ALCOHOL: NEVER
HOW MANY STANDARD DRINKS CONTAINING ALCOHOL DO YOU HAVE ON A TYPICAL DAY: 0
HOW OFTEN DO YOU HAVE A DRINK CONTAINING ALCOHOL: 1
HOW OFTEN DO YOU HAVE SIX OR MORE DRINKS ON ONE OCCASION: 1

## 2025-03-14 ASSESSMENT — PATIENT HEALTH QUESTIONNAIRE - PHQ9
SUM OF ALL RESPONSES TO PHQ QUESTIONS 1-9: 0
2. FEELING DOWN, DEPRESSED OR HOPELESS: NOT AT ALL
1. LITTLE INTEREST OR PLEASURE IN DOING THINGS: NOT AT ALL

## 2025-03-17 ENCOUNTER — OFFICE VISIT (OUTPATIENT)
Dept: FAMILY MEDICINE CLINIC | Age: 65
End: 2025-03-17
Payer: MEDICARE

## 2025-03-17 VITALS
RESPIRATION RATE: 16 BRPM | BODY MASS INDEX: 26.88 KG/M2 | HEART RATE: 78 BPM | SYSTOLIC BLOOD PRESSURE: 112 MMHG | OXYGEN SATURATION: 99 % | DIASTOLIC BLOOD PRESSURE: 74 MMHG | WEIGHT: 187.8 LBS | HEIGHT: 70 IN

## 2025-03-17 DIAGNOSIS — E78.2 MIXED HYPERLIPIDEMIA: ICD-10-CM

## 2025-03-17 DIAGNOSIS — R73.09 ELEVATED GLUCOSE: ICD-10-CM

## 2025-03-17 DIAGNOSIS — Z00.00 INITIAL MEDICARE ANNUAL WELLNESS VISIT: Primary | ICD-10-CM

## 2025-03-17 DIAGNOSIS — Z12.5 PROSTATE CANCER SCREENING: ICD-10-CM

## 2025-03-17 LAB
ALBUMIN SERPL-MCNC: 4.5 G/DL (ref 3.4–5)
ALBUMIN/GLOB SERPL: 2.1 {RATIO} (ref 1.1–2.2)
ALP SERPL-CCNC: 91 U/L (ref 40–129)
ALT SERPL-CCNC: 31 U/L (ref 10–40)
ANION GAP SERPL CALCULATED.3IONS-SCNC: 8 MMOL/L (ref 3–16)
AST SERPL-CCNC: 26 U/L (ref 15–37)
BILIRUB SERPL-MCNC: 0.6 MG/DL (ref 0–1)
BUN SERPL-MCNC: 11 MG/DL (ref 7–20)
CALCIUM SERPL-MCNC: 9.5 MG/DL (ref 8.3–10.6)
CHLORIDE SERPL-SCNC: 102 MMOL/L (ref 99–110)
CHOLEST SERPL-MCNC: 129 MG/DL (ref 0–199)
CO2 SERPL-SCNC: 27 MMOL/L (ref 21–32)
CREAT SERPL-MCNC: 0.9 MG/DL (ref 0.8–1.3)
EST. AVERAGE GLUCOSE BLD GHB EST-MCNC: 111.2 MG/DL
GFR SERPLBLD CREATININE-BSD FMLA CKD-EPI: >90 ML/MIN/{1.73_M2}
GLUCOSE SERPL-MCNC: 101 MG/DL (ref 70–99)
HBA1C MFR BLD: 5.5 %
HDLC SERPL-MCNC: 39 MG/DL (ref 40–60)
LDLC SERPL CALC-MCNC: 67 MG/DL
POTASSIUM SERPL-SCNC: 4.2 MMOL/L (ref 3.5–5.1)
PROT SERPL-MCNC: 6.6 G/DL (ref 6.4–8.2)
PSA SERPL DL<=0.01 NG/ML-MCNC: 0.48 NG/ML (ref 0–4)
SODIUM SERPL-SCNC: 137 MMOL/L (ref 136–145)
TRIGL SERPL-MCNC: 115 MG/DL (ref 0–150)
VLDLC SERPL CALC-MCNC: 23 MG/DL

## 2025-03-17 PROCEDURE — 99213 OFFICE O/P EST LOW 20 MIN: CPT | Performed by: PHYSICIAN ASSISTANT

## 2025-03-17 PROCEDURE — G0009 ADMIN PNEUMOCOCCAL VACCINE: HCPCS | Performed by: PHYSICIAN ASSISTANT

## 2025-03-17 PROCEDURE — 1123F ACP DISCUSS/DSCN MKR DOCD: CPT | Performed by: PHYSICIAN ASSISTANT

## 2025-03-17 PROCEDURE — 1159F MED LIST DOCD IN RCRD: CPT | Performed by: PHYSICIAN ASSISTANT

## 2025-03-17 PROCEDURE — G0438 PPPS, INITIAL VISIT: HCPCS | Performed by: PHYSICIAN ASSISTANT

## 2025-03-17 PROCEDURE — 90677 PCV20 VACCINE IM: CPT | Performed by: PHYSICIAN ASSISTANT

## 2025-03-17 NOTE — PROGRESS NOTES
Visit Medications    Medication Sig Taking? Authorizing Provider   atorvastatin (LIPITOR) 80 MG tablet TAKE 1 TABLET BY MOUTH EVERY DAY Yes Angela Stephens PA   Multiple Vitamins-Minerals (THERAPEUTIC MULTIVITAMIN-MINERALS) tablet Take 1 tablet by mouth daily Yes ProviderDino MD   aspirin 81 MG EC tablet TAKE 1 TABLET (81 MG TOTAL) BY MOUTH DAILY WITH BREAKFAST. Yes ProviderDino MD       CareTeam (Including outside providers/suppliers regularly involved in providing care):   Patient Care Team:  Angela Stephens PA as PCP - General (Physician Assistant)  Angela Stephens PA as PCP - Empaneled Provider     Recommendations for Preventive Services Due: see orders and patient instructions/AVS.  Recommended screening schedule for the next 5-10 years is provided to the patient in written form: see Patient Instructions/AVS.     Reviewed and updated this visit:  Tobacco  Allergies  Meds  Sexual Hx

## 2025-03-17 NOTE — PATIENT INSTRUCTIONS
attack. These may include:    Chest pain or pressure, or a strange feeling in the chest.     Sweating.     Shortness of breath.     Pain, pressure, or a strange feeling in the back, neck, jaw, or upper belly or in one or both shoulders or arms.     Lightheadedness or sudden weakness.     A fast or irregular heartbeat.   After you call 911, the  may tell you to chew 1 adult-strength or 2 to 4 low-dose aspirin. Wait for an ambulance. Do not try to drive yourself.  Watch closely for changes in your health, and be sure to contact your doctor if you have any problems.  Where can you learn more?  Go to https://www.[employer].net/patientEd and enter F075 to learn more about \"A Healthy Heart: Care Instructions.\"  Current as of: July 31, 2024  Content Version: 14.4  © 2212-8160 [employer].   Care instructions adapted under license by [employer]. If you have questions about a medical condition or this instruction, always ask your healthcare professional. [employer], disclaims any warranty or liability for your use of this information.    Personalized Preventive Plan for Tejinder Cosme - 3/17/2025  Medicare offers a range of preventive health benefits. Some of the tests and screenings are paid in full while other may be subject to a deductible, co-insurance, and/or copay.  Some of these benefits include a comprehensive review of your medical history including lifestyle, illnesses that may run in your family, and various assessments and screenings as appropriate.  After reviewing your medical record and screening and assessments performed today your provider may have ordered immunizations, labs, imaging, and/or referrals for you.  A list of these orders (if applicable) as well as your Preventive Care list are included within your After Visit Summary for your review.

## 2025-03-18 ENCOUNTER — RESULTS FOLLOW-UP (OUTPATIENT)
Dept: FAMILY MEDICINE CLINIC | Age: 65
End: 2025-03-18

## 2025-07-16 ENCOUNTER — PROCEDURE VISIT (OUTPATIENT)
Dept: AUDIOLOGY | Age: 65
End: 2025-07-16

## 2025-07-16 DIAGNOSIS — H90.3 SENSORINEURAL HEARING LOSS, BILATERAL: Primary | ICD-10-CM

## 2025-07-16 NOTE — PROGRESS NOTES
Tejinder Cosme  1960.64 y.o. male   [id number]    RIGHT EAR: /MODEL: Phonak Audeo P70-R  SN: [id number]  EARMOLD/TUBING/WIRE/DOME: 2(M) with large vented domes    LEFT EAR: /MODEL: Phonak Audeo P70-R  SN: [id number]  EARMOLD/TUBING/WIRE/DOME: 2(M) with large vented domes    HAF: 11/27/2020  30 DAY RIGHT-TO-RETURN: 12/27/2020  WARRANTY: 2/17/2023    HEARING AID CHECK    Tejinder Cosme was seen today, 7/16/2025, for a hearing aid check appointment    PROCEDURES:     Otoscopy revealed: Non-occluding cerumen; cerumen removed via lighted curette with patients permission without incident.     Patient noted devices seem a little loud and feedback noted specifically in left ear.  Hearing aids were cleaned and checked.  A listening check revealed good function of the devices.  Microphone and  ports were suctioned, domes and wax traps were changed, and devices completed a desiccant cycle through [employer]. Post-cleaning listening check revealed good function of the devices.       Connected devices to fitting software. Reduced gain bilaterally. Re-ran feedback manager. After adjustments patient reported improved sound quality and no feedback noted. He also noted decreased battery life- counseled on out-of warranty repair($290/device) vs. Getting additional  around $150-$200. Patient decided to wait and make a decision at a later date.     PATIENT EDUCATION:     - Verbally and visually reviewed importance of consistent use and care and maintenance of devices.      Information was verbally shared with patient during appointment.        RECOMMENDATIONS:     Continue consistent hearing aid use  Return for hearing aid checks as needed  Retest hearing as medically indicated and/or sooner if a change in hearing is noted.  Contact audiologist with questions/concerns as needed    **No charge for today's appointment; patient not charged due to scheduling issues on our end     Dorothy

## 2025-07-23 NOTE — PROGRESS NOTES
rhinorrhea and sneezing.    Eyes:  Negative for pain and visual disturbance.   Respiratory:  Negative for cough and shortness of breath.    Cardiovascular:  Negative for chest pain.   Gastrointestinal:  Negative for nausea and vomiting.   Endocrine: Negative.    Genitourinary: Negative.    Musculoskeletal:  Negative for neck pain and neck stiffness.   Skin:  Negative for rash.   Neurological:  Negative for dizziness and headaches.      PHYSICAL EXAM  BP (!) 147/80   Pulse 81   Ht 1.778 m (5' 10\")   Wt 83.9 kg (185 lb)   BMI 26.54 kg/m²   GENERAL: No Acute Distress, Alert and Oriented, no hoarseness  EYES: EOMI, Anti-icteric  NOSE: No epistaxis, nasal mucosa within normal limits, no purulent drainage  EARS: Normal external canal appearance, EAC patent bilaterally, TM's intact bilaterally with no evidence of effusions   FACE: 1/6 House-Brackmann Scale, symmetric, sensation equal bilaterally  ORAL CAVITY: No masses or lesions palpated, uvula is midline, moist mucous membranes,   NECK: Normal range of motion, no thyromegaly, trachea is midline, no lymphadenopathy, no neck masses, no crepitus  CHEST: Normal respiratory effort, no retractions, breathing comfortably  SKIN: No rashes, normal appearing skin, no evidence of skin lesions/tumors  RADIOLOGY  Summary of findings:    PROCEDURE  Nasal Endoscopy    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 After topical anesthesia and decongestion had been obtained using aerosolized 1% lidocaine and oxymetazoline, a 45 degree rigid endoscope was placed into both nares with the patient in a sitting position. The following was observed:    Right Nasal Cavity and Paranasal Sinuses:  Polyp score = 0 (0 = no polyps, 1 = small polyps in middle meatus not reaching below the inferior border of the middle kayode, 2 = polyps reaching below the middle border of the middle turbinate, 3= large polyps reaching the lower border of the inferior turbinate or

## 2025-07-25 ENCOUNTER — OFFICE VISIT (OUTPATIENT)
Dept: ENT CLINIC | Age: 65
End: 2025-07-25

## 2025-07-25 VITALS
WEIGHT: 185 LBS | HEART RATE: 81 BPM | DIASTOLIC BLOOD PRESSURE: 80 MMHG | HEIGHT: 70 IN | SYSTOLIC BLOOD PRESSURE: 147 MMHG | BODY MASS INDEX: 26.48 KG/M2

## 2025-07-25 DIAGNOSIS — J30.2 SEASONAL ALLERGIES: Primary | ICD-10-CM

## 2025-07-25 DIAGNOSIS — K21.9 LARYNGOPHARYNGEAL REFLUX (LPR): ICD-10-CM

## 2025-07-25 RX ORDER — AZELASTINE 1 MG/ML
1 SPRAY, METERED NASAL 2 TIMES DAILY
Qty: 30 ML | Refills: 3 | Status: SHIPPED | OUTPATIENT
Start: 2025-07-25

## 2025-07-25 ASSESSMENT — ENCOUNTER SYMPTOMS
NAUSEA: 0
VOICE CHANGE: 1
COUGH: 0
EYE PAIN: 0
SHORTNESS OF BREATH: 0
RHINORRHEA: 0

## 2025-08-27 ASSESSMENT — ENCOUNTER SYMPTOMS
EYE PAIN: 0
SHORTNESS OF BREATH: 0
COUGH: 0
VOICE CHANGE: 1
NAUSEA: 0
RHINORRHEA: 0
VOMITING: 0

## 2025-08-28 ENCOUNTER — OFFICE VISIT (OUTPATIENT)
Dept: ENT CLINIC | Age: 65
End: 2025-08-28
Payer: MEDICARE

## 2025-08-28 VITALS
DIASTOLIC BLOOD PRESSURE: 79 MMHG | SYSTOLIC BLOOD PRESSURE: 124 MMHG | BODY MASS INDEX: 27.06 KG/M2 | WEIGHT: 189 LBS | HEIGHT: 70 IN | HEART RATE: 80 BPM

## 2025-08-28 DIAGNOSIS — K21.9 LARYNGOPHARYNGEAL REFLUX (LPR): ICD-10-CM

## 2025-08-28 DIAGNOSIS — J30.0 VASOMOTOR RHINITIS: Primary | ICD-10-CM

## 2025-08-28 DIAGNOSIS — J30.2 SEASONAL ALLERGIES: ICD-10-CM

## 2025-08-28 PROCEDURE — 31231 NASAL ENDOSCOPY DX: CPT | Performed by: STUDENT IN AN ORGANIZED HEALTH CARE EDUCATION/TRAINING PROGRAM

## 2025-08-28 PROCEDURE — 1160F RVW MEDS BY RX/DR IN RCRD: CPT | Performed by: STUDENT IN AN ORGANIZED HEALTH CARE EDUCATION/TRAINING PROGRAM

## 2025-08-28 PROCEDURE — 99214 OFFICE O/P EST MOD 30 MIN: CPT | Performed by: STUDENT IN AN ORGANIZED HEALTH CARE EDUCATION/TRAINING PROGRAM

## 2025-08-28 PROCEDURE — 1123F ACP DISCUSS/DSCN MKR DOCD: CPT | Performed by: STUDENT IN AN ORGANIZED HEALTH CARE EDUCATION/TRAINING PROGRAM

## 2025-08-28 PROCEDURE — 1159F MED LIST DOCD IN RCRD: CPT | Performed by: STUDENT IN AN ORGANIZED HEALTH CARE EDUCATION/TRAINING PROGRAM

## 2025-08-28 RX ORDER — IPRATROPIUM BROMIDE 21 UG/1
2 SPRAY, METERED NASAL 4 TIMES DAILY
Qty: 30 ML | Refills: 3 | Status: SHIPPED | OUTPATIENT
Start: 2025-08-28

## 2025-09-02 RX ORDER — ATORVASTATIN CALCIUM 80 MG/1
80 TABLET, FILM COATED ORAL DAILY
Qty: 90 TABLET | Refills: 1 | Status: SHIPPED | OUTPATIENT
Start: 2025-09-02

## (undated) DEVICE — SUTURE ETHLN SZ 4-0 L18IN NONABSORBABLE BLK L19MM PS-2 3/8 1667H

## (undated) DEVICE — GLOVE,SURG,SENSICARE,ALOE,LF,PF,7: Brand: MEDLINE

## (undated) DEVICE — CORD ES L12FT BPLR FRCP

## (undated) DEVICE — BANDAGE GZ W2XL75IN ST RAYON POLY CNFRM STRTCH LTWT

## (undated) DEVICE — Device

## (undated) DEVICE — NEEDLE HYPO 25GA L1.5IN BLU POLYPR HUB S STL REG BVL STR

## (undated) DEVICE — SYRINGE, LUER LOCK, 10ML: Brand: MEDLINE

## (undated) DEVICE — PADDING CAST W4INXL4YD NONSTERILE COT RAYON MICROPLEATED

## (undated) DEVICE — GOWN,AURORA,NONREINF,RAGLAN,XXL,STERILE: Brand: MEDLINE

## (undated) DEVICE — STANDARD HYPODERMIC NEEDLE,POLYPROPYLENE HUB: Brand: MONOJECT

## (undated) DEVICE — GLOVE SURG SZ 75 L12IN FNGR THK94MIL STD WHT LTX FREE

## (undated) DEVICE — BANDAGE COMPR W2INXL5YD HI E BGE W/ CLP SURE-WRAP

## (undated) DEVICE — ZIMMER® STERILE DISPOSABLE TOURNIQUET CUFF WITH PLC, DUAL PORT, SINGLE BLADDER, 18 IN. (46 CM)

## (undated) DEVICE — SOLUTION IV IRRIG 500ML 0.9% SODIUM CHL 2F7123

## (undated) DEVICE — BLADE OPHTH 180DEG CUT SURF BLU STR SHRP DBL BVL GRINDLESS